# Patient Record
Sex: MALE | Race: BLACK OR AFRICAN AMERICAN | NOT HISPANIC OR LATINO | Employment: OTHER | URBAN - METROPOLITAN AREA
[De-identification: names, ages, dates, MRNs, and addresses within clinical notes are randomized per-mention and may not be internally consistent; named-entity substitution may affect disease eponyms.]

---

## 2023-05-03 ENCOUNTER — OFFICE VISIT (OUTPATIENT)
Dept: PRIMARY CARE | Facility: CLINIC | Age: 73
End: 2023-05-03
Payer: COMMERCIAL

## 2023-05-03 VITALS
HEART RATE: 79 BPM | SYSTOLIC BLOOD PRESSURE: 118 MMHG | BODY MASS INDEX: 33.05 KG/M2 | DIASTOLIC BLOOD PRESSURE: 76 MMHG | TEMPERATURE: 97.7 F | OXYGEN SATURATION: 96 % | WEIGHT: 211 LBS

## 2023-05-03 DIAGNOSIS — E11.9 TYPE 2 DIABETES MELLITUS WITHOUT COMPLICATION, WITHOUT LONG-TERM CURRENT USE OF INSULIN (MULTI): Primary | ICD-10-CM

## 2023-05-03 DIAGNOSIS — G47.33 OBSTRUCTIVE SLEEP APNEA SYNDROME: ICD-10-CM

## 2023-05-03 DIAGNOSIS — E78.5 HYPERLIPIDEMIA, UNSPECIFIED HYPERLIPIDEMIA TYPE: ICD-10-CM

## 2023-05-03 DIAGNOSIS — I10 BENIGN ESSENTIAL HYPERTENSION: ICD-10-CM

## 2023-05-03 DIAGNOSIS — E66.1 CLASS 1 DRUG-INDUCED OBESITY WITH SERIOUS COMORBIDITY AND BODY MASS INDEX (BMI) OF 33.0 TO 33.9 IN ADULT: ICD-10-CM

## 2023-05-03 DIAGNOSIS — Z00.00 MEDICARE ANNUAL WELLNESS VISIT, SUBSEQUENT: ICD-10-CM

## 2023-05-03 PROBLEM — E66.811 CLASS 1 DRUG-INDUCED OBESITY WITH SERIOUS COMORBIDITY AND BODY MASS INDEX (BMI) OF 33.0 TO 33.9 IN ADULT: Status: ACTIVE | Noted: 2023-05-03

## 2023-05-03 PROBLEM — N40.0 BPH (BENIGN PROSTATIC HYPERPLASIA): Status: ACTIVE | Noted: 2023-05-03

## 2023-05-03 LAB — POC HEMOGLOBIN A1C: 8.7 % (ref 4.2–6.5)

## 2023-05-03 PROCEDURE — 3074F SYST BP LT 130 MM HG: CPT | Performed by: STUDENT IN AN ORGANIZED HEALTH CARE EDUCATION/TRAINING PROGRAM

## 2023-05-03 PROCEDURE — 1036F TOBACCO NON-USER: CPT | Performed by: STUDENT IN AN ORGANIZED HEALTH CARE EDUCATION/TRAINING PROGRAM

## 2023-05-03 PROCEDURE — 83036 HEMOGLOBIN GLYCOSYLATED A1C: CPT | Performed by: STUDENT IN AN ORGANIZED HEALTH CARE EDUCATION/TRAINING PROGRAM

## 2023-05-03 PROCEDURE — 4010F ACE/ARB THERAPY RXD/TAKEN: CPT | Performed by: STUDENT IN AN ORGANIZED HEALTH CARE EDUCATION/TRAINING PROGRAM

## 2023-05-03 PROCEDURE — 99397 PER PM REEVAL EST PAT 65+ YR: CPT | Performed by: STUDENT IN AN ORGANIZED HEALTH CARE EDUCATION/TRAINING PROGRAM

## 2023-05-03 PROCEDURE — 1159F MED LIST DOCD IN RCRD: CPT | Performed by: STUDENT IN AN ORGANIZED HEALTH CARE EDUCATION/TRAINING PROGRAM

## 2023-05-03 PROCEDURE — 1170F FXNL STATUS ASSESSED: CPT | Performed by: STUDENT IN AN ORGANIZED HEALTH CARE EDUCATION/TRAINING PROGRAM

## 2023-05-03 PROCEDURE — 1160F RVW MEDS BY RX/DR IN RCRD: CPT | Performed by: STUDENT IN AN ORGANIZED HEALTH CARE EDUCATION/TRAINING PROGRAM

## 2023-05-03 PROCEDURE — 3008F BODY MASS INDEX DOCD: CPT | Performed by: STUDENT IN AN ORGANIZED HEALTH CARE EDUCATION/TRAINING PROGRAM

## 2023-05-03 PROCEDURE — G0439 PPPS, SUBSEQ VISIT: HCPCS | Performed by: STUDENT IN AN ORGANIZED HEALTH CARE EDUCATION/TRAINING PROGRAM

## 2023-05-03 PROCEDURE — 3078F DIAST BP <80 MM HG: CPT | Performed by: STUDENT IN AN ORGANIZED HEALTH CARE EDUCATION/TRAINING PROGRAM

## 2023-05-03 RX ORDER — ROSUVASTATIN CALCIUM 10 MG/1
10 TABLET, COATED ORAL DAILY
COMMUNITY
End: 2023-06-12 | Stop reason: SDUPTHER

## 2023-05-03 RX ORDER — LATANOPROST 50 UG/ML
SOLUTION/ DROPS OPHTHALMIC
COMMUNITY

## 2023-05-03 RX ORDER — DAPAGLIFLOZIN 10 MG/1
10 TABLET, FILM COATED ORAL
COMMUNITY
Start: 2022-08-09 | End: 2023-11-10 | Stop reason: SDUPTHER

## 2023-05-03 RX ORDER — OLMESARTAN MEDOXOMIL 20 MG/1
20 TABLET ORAL DAILY
COMMUNITY
End: 2023-07-25 | Stop reason: SDUPTHER

## 2023-05-03 RX ORDER — ERGOCALCIFEROL 1.25 MG/1
CAPSULE ORAL WEEKLY
COMMUNITY
Start: 2015-04-23

## 2023-05-03 RX ORDER — BLOOD SUGAR DIAGNOSTIC
STRIP MISCELLANEOUS
COMMUNITY
Start: 2021-03-29

## 2023-05-03 RX ORDER — ASPIRIN 81 MG/1
81 TABLET ORAL
COMMUNITY
Start: 2011-04-26

## 2023-05-03 RX ORDER — AMLODIPINE BESYLATE 5 MG/1
5 TABLET ORAL DAILY
COMMUNITY
End: 2023-05-03 | Stop reason: WASHOUT

## 2023-05-03 RX ORDER — AMLODIPINE BESYLATE 5 MG/1
5 TABLET ORAL
COMMUNITY
Start: 2022-08-23 | End: 2023-07-25 | Stop reason: SDUPTHER

## 2023-05-03 RX ORDER — DAPAGLIFLOZIN 10 MG/1
10 TABLET, FILM COATED ORAL DAILY
COMMUNITY
End: 2023-05-03 | Stop reason: WASHOUT

## 2023-05-03 RX ORDER — ROSUVASTATIN CALCIUM 10 MG/1
1 TABLET, COATED ORAL NIGHTLY
COMMUNITY
Start: 2012-08-20 | End: 2023-05-03 | Stop reason: WASHOUT

## 2023-05-03 RX ORDER — POTASSIUM CHLORIDE 750 MG/1
TABLET, FILM COATED, EXTENDED RELEASE ORAL 2 TIMES DAILY
COMMUNITY
Start: 2015-04-23

## 2023-05-03 RX ORDER — PIOGLITAZONE HCL AND METFORMIN HCL 850; 15 MG/1; MG/1
TABLET ORAL
COMMUNITY
Start: 2014-05-28 | End: 2023-12-19 | Stop reason: SDUPTHER

## 2023-05-03 RX ORDER — METFORMIN HYDROCHLORIDE 850 MG/1
850 TABLET ORAL
COMMUNITY
End: 2023-05-03 | Stop reason: WASHOUT

## 2023-05-03 RX ORDER — FAMOTIDINE 20 MG/1
TABLET, FILM COATED ORAL
COMMUNITY
Start: 2020-04-14

## 2023-05-03 RX ORDER — TIMOLOL/BRIMONIDIN/DORZOLAM/PF 0.5-.15-2%
DROPS OPHTHALMIC (EYE)
COMMUNITY
Start: 2018-08-20

## 2023-05-03 RX ORDER — OMEPRAZOLE 20 MG/1
CAPSULE, DELAYED RELEASE ORAL
COMMUNITY
Start: 2020-04-14

## 2023-05-03 RX ORDER — SILDENAFIL 100 MG/1
TABLET, FILM COATED ORAL
COMMUNITY
Start: 2021-03-23

## 2023-05-03 ASSESSMENT — ACTIVITIES OF DAILY LIVING (ADL)
MANAGING_FINANCES: INDEPENDENT
DOING_HOUSEWORK: INDEPENDENT
BATHING: INDEPENDENT
DRESSING: INDEPENDENT
TAKING_MEDICATION: INDEPENDENT
GROCERY_SHOPPING: INDEPENDENT

## 2023-05-03 ASSESSMENT — PATIENT HEALTH QUESTIONNAIRE - PHQ9
SUM OF ALL RESPONSES TO PHQ9 QUESTIONS 1 AND 2: 0
2. FEELING DOWN, DEPRESSED OR HOPELESS: NOT AT ALL
1. LITTLE INTEREST OR PLEASURE IN DOING THINGS: NOT AT ALL

## 2023-05-03 NOTE — PATIENT INSTRUCTIONS
Please stop at the lab ([address]) to complete your blood and/or urine work that I've ordered for you.    I will contact you with the results at my soonest convenience. I strongly urge you to use [facility] as this is the quickest and easiest way to access your results and recieve my correspondences.     I was unable to fill out the forms for you today, as you do not qualify as having a disability    I recommend that you lose weight via lifestyle modifications such as diet and exercise.     Follow up with your specialists as previously scheduled.    See me every 6 months.

## 2023-05-03 NOTE — PROGRESS NOTES
Subjective   Reason for Visit: Santo Mcdaniel Jr. is an 73 y.o. male here for a Medicare Wellness visit and Annual Exam.    Past Medical, Surgical, and Family History reviewed and updated in chart.    Reviewed all medications by prescribing practitioner or clinical pharmacist (such as prescriptions, OTCs, herbal therapies and supplements) and documented in the medical record.    Patient normally sees Dr. Whitehead in the practice, in my schedule today as he retired and patient is transitioning care.     HPI  Re: DM2 - A1c not at goal, 8.7%. Has close follow up with endorcinology, pt opts to get his meds changed by this doctor. He tells me he hasn't been as adherent to his meds as he used to be, as his girlfriend of many years recently passed away. Patient denies symptomatic highs or lows with regards to their glucose. Reports no polyuria, polydipsia, fatigue, vision changes, and stocking/glove neuropathy. Reports fair foot hygiene, denies knowledge of any foot ulcers or trauma.     Re: CYNDIE - good compliance with CPAP.    Re: obesity - BMI ~33. Contemplative about meaningful lifestyle changes.    Re: HTN - at goal today. Reports no sx high or low from HTN; denies blurry vision, HA, dizziness LoC CP SoB Riojas and leg swelling     Re: support dog - asking for paperwork to be filled out for support animal; he expressed desire to be the caretaker for his  girlfriend's dog, however patient without a disability and also no rapport with a  to justify having this pet as a support animal.     Re: HM - PSA no longer indicated. Colon screen UTD. Shots UTD.     PMHx, FHx, Social Hx, Surg Hx personally reviewed at this appointment. No pertinent findings and/or changes from prior (if applicable).    ROS: Denies wt gain/loss f/c HA LoC CP SOB NVDC. See HPI above, and scanned sheet (if applicable). All other systems are reviewed and are without complaint.       Patient Care Team:  Eliseo Swanson MD as PCP - General      Review of Systems    Objective   Vitals:  /76   Pulse 79   Temp 36.5 °C (97.7 °F)   Wt 95.7 kg (211 lb)   SpO2 96%   BMI 33.05 kg/m²       Physical Exam  Gen: obese, NAD. AAO x3.  HEENT: NC/AT. Anicteric sclera, symmetric pupils. MMM no thrush.  Neck: Soft, supple. No LAD. No goiter.  CV: RRR nl s1s2 no m/r/g  Pulm: CTAB no w/r/r, good air exchange  GI: soft NTND BS+ no hsm  Ext: WWP no edema  Neuro: II-XII grossly intact, nonfocal systemic findings  MSK: 5/5 strength b/l UE and LE  Gait: unremarkable   Feet: no ulcers, nl monofilament testing       Assessment/Plan   # DM2: Poorly controlled, not at goal  - Patient opts to have meds changed at endocrine apt in coming weeks  - medication compliance talked about at great length today   - Diabetic labs  - counselled on wt loss, diet, exercise, and lifestyle modifications  - yearly foot exams, eye exams     # HTN: at/near goal  - continue current regimen  - routine lab work if not recent  - continue lifestyle modifications    # CYNDIE on CPAP  - continue using CPAP     # Obesity: BMI > 30  - counselled on wt loss via diet, exercise, and other lifestyle modifications     # question about emotional Support animal  - pt does not qualify per the forms he brought in    # Health Maintenance  - routine blood work  - Colon Cancer Screening: UTD  - PSA: no longer indicated due to age   - Immunizations:  UTD  - AAA screening: not indicated         Problem List Items Addressed This Visit       Benign essential hypertension    Diabetes mellitus (CMS/HCC) - Primary    Relevant Orders    CBC and Auto Differential    Comprehensive Metabolic Panel    Lipid Panel    Albumin , Urine Random    Urinalysis Microscopic Only    POCT glycosylated hemoglobin (Hb A1C) manually resulted (Completed)    Follow Up In [facility] - PCP    Obstructive sleep apnea syndrome    Hyperlipidemia    Class 1 drug-induced obesity with serious comorbidity and body mass index (BMI) of 33.0  to 33.9 in adult

## 2023-06-12 ENCOUNTER — OFFICE VISIT (OUTPATIENT)
Dept: PRIMARY CARE | Facility: CLINIC | Age: 73
End: 2023-06-12
Payer: COMMERCIAL

## 2023-06-12 VITALS
OXYGEN SATURATION: 98 % | HEIGHT: 67 IN | HEART RATE: 73 BPM | WEIGHT: 215 LBS | BODY MASS INDEX: 33.74 KG/M2 | DIASTOLIC BLOOD PRESSURE: 81 MMHG | TEMPERATURE: 97.6 F | SYSTOLIC BLOOD PRESSURE: 139 MMHG

## 2023-06-12 DIAGNOSIS — E11.9 TYPE 2 DIABETES MELLITUS WITHOUT COMPLICATION, WITHOUT LONG-TERM CURRENT USE OF INSULIN (MULTI): ICD-10-CM

## 2023-06-12 DIAGNOSIS — E78.5 HYPERLIPIDEMIA, UNSPECIFIED HYPERLIPIDEMIA TYPE: Primary | ICD-10-CM

## 2023-06-12 PROBLEM — K21.9 ESOPHAGEAL REFLUX: Status: ACTIVE | Noted: 2023-06-12

## 2023-06-12 PROCEDURE — 1159F MED LIST DOCD IN RCRD: CPT | Performed by: STUDENT IN AN ORGANIZED HEALTH CARE EDUCATION/TRAINING PROGRAM

## 2023-06-12 PROCEDURE — 99213 OFFICE O/P EST LOW 20 MIN: CPT | Performed by: STUDENT IN AN ORGANIZED HEALTH CARE EDUCATION/TRAINING PROGRAM

## 2023-06-12 PROCEDURE — 4010F ACE/ARB THERAPY RXD/TAKEN: CPT | Performed by: STUDENT IN AN ORGANIZED HEALTH CARE EDUCATION/TRAINING PROGRAM

## 2023-06-12 PROCEDURE — 3075F SYST BP GE 130 - 139MM HG: CPT | Performed by: STUDENT IN AN ORGANIZED HEALTH CARE EDUCATION/TRAINING PROGRAM

## 2023-06-12 PROCEDURE — 1036F TOBACCO NON-USER: CPT | Performed by: STUDENT IN AN ORGANIZED HEALTH CARE EDUCATION/TRAINING PROGRAM

## 2023-06-12 PROCEDURE — 3008F BODY MASS INDEX DOCD: CPT | Performed by: STUDENT IN AN ORGANIZED HEALTH CARE EDUCATION/TRAINING PROGRAM

## 2023-06-12 PROCEDURE — 1160F RVW MEDS BY RX/DR IN RCRD: CPT | Performed by: STUDENT IN AN ORGANIZED HEALTH CARE EDUCATION/TRAINING PROGRAM

## 2023-06-12 PROCEDURE — 3079F DIAST BP 80-89 MM HG: CPT | Performed by: STUDENT IN AN ORGANIZED HEALTH CARE EDUCATION/TRAINING PROGRAM

## 2023-06-12 RX ORDER — ROSUVASTATIN CALCIUM 10 MG/1
10 TABLET, COATED ORAL DAILY
Qty: 90 TABLET | Refills: 3 | Status: SHIPPED | OUTPATIENT
Start: 2023-06-12 | End: 2024-06-11

## 2023-06-12 NOTE — PROGRESS NOTES
"Subjective   Patient ID: Santo Mcdaniel Jr. is a 73 y.o. male who presents for Follow-up.    HPI   Re: DM2 - A1c last checked a few weeks ago, was not at goal, 8.7%. Has close follow up with endorcinology, pt opts to get his meds changed by this doctor. He tells me he hasn't been as adherent to his meds as he used to be, as his girlfriend of many years recently passed away. Patient denies symptomatic highs or lows with regards to their glucose. Reports no polyuria, polydipsia, fatigue, vision changes, and stocking/glove neuropathy. Reports fair foot hygiene, denies knowledge of any foot ulcers or trauma.      Re: CYNDIE - good compliance with CPAP.     Re: obesity - BMI ~33. Contemplative about meaningful lifestyle changes.     Re: HTN - at/near goal today. Reports no sx high or low from HTN; denies blurry vision, HA, dizziness LoC CP SoB Riojas and leg swelling       Re: HM - PSA no longer indicated. Colon screen UTD. Shots UTD.      PMHx, FHx, Social Hx, Surg Hx personally reviewed at this appointment. No pertinent findings and/or changes from prior (if applicable).     ROS: Denies wt gain/loss f/c HA LoC CP SOB NVDC. See HPI above, and scanned sheet (if applicable). All other systems are reviewed and are without complaint.   Review of Systems    Objective   /81   Pulse 73   Temp 36.4 °C (97.6 °F)   Ht 1.702 m (5' 7\")   Wt 97.5 kg (215 lb)   SpO2 98%   BMI 33.67 kg/m²     Physical Exam  Gen: obese, NAD. AAO x3.  HEENT: NC/AT. Anicteric sclera, symmetric pupils. MMM no thrush.  Neck: Soft, supple. No LAD. No goiter.  CV: RRR nl s1s2 no m/r/g  Pulm: CTAB no w/r/r, good air exchange  GI: soft NTND BS+ no hsm  Ext: WWP no edema  Neuro: II-XII grossly intact, nonfocal systemic findings  MSK: 5/5 strength b/l UE and LE  Gait: unremarkable   Feet: no ulcers, nl monofilament testing     Assessment/Plan   # DM2: Not at goal  - will touch base with Dr. Abdalla  - medication compliance talked about at great length today "   - Diabetic labs  - counselled on wt loss, diet, exercise, and lifestyle modifications  - yearly foot exams, eye exams      # HTN: at/near goal  - continue current regimen  - routine lab work if not recent  - continue lifestyle modifications     # CYNDIE on CPAP  - continue using CPAP      # Obesity: BMI > 30  - counselled on wt loss via diet, exercise, and other lifestyle modifications      # question about emotional Support animal  - pt does not qualify per the forms he brought in     # Health Maintenance  - routine blood work  - Colon Cancer Screening: UTD  - PSA: no longer indicated due to age   - Immunizations:  UTD  - AAA screening: not indicated     Problem List Items Addressed This Visit       Diabetes mellitus (CMS/HCC)    Relevant Medications    rosuvastatin (Crestor) 10 mg tablet    Hyperlipidemia - Primary    Relevant Medications    rosuvastatin (Crestor) 10 mg tablet

## 2023-06-12 NOTE — PATIENT INSTRUCTIONS
Please stop at the lab ([address]) to complete your blood and/or urine work that I've ordered for you.    I will contact you with the results at my soonest convenience. I strongly urge you to use [facility] as this is the quickest and easiest way to access your results and recieve my correspondences.    I have renewed your chronic medications today    I will reach out to Dr. Abdalla about your sugars.

## 2023-07-25 DIAGNOSIS — I10 BENIGN ESSENTIAL HYPERTENSION: ICD-10-CM

## 2023-07-25 RX ORDER — AMLODIPINE BESYLATE 5 MG/1
5 TABLET ORAL
Qty: 90 TABLET | Refills: 2 | Status: SHIPPED | OUTPATIENT
Start: 2023-07-25 | End: 2023-11-08 | Stop reason: SDUPTHER

## 2023-07-25 RX ORDER — OLMESARTAN MEDOXOMIL 20 MG/1
20 TABLET ORAL DAILY
Qty: 90 TABLET | Refills: 2 | Status: SHIPPED | OUTPATIENT
Start: 2023-07-25 | End: 2023-11-08 | Stop reason: SDUPTHER

## 2023-11-06 ENCOUNTER — OFFICE VISIT (OUTPATIENT)
Dept: PRIMARY CARE | Facility: CLINIC | Age: 73
End: 2023-11-06
Payer: MEDICARE

## 2023-11-06 VITALS
WEIGHT: 211.2 LBS | BODY MASS INDEX: 33.08 KG/M2 | OXYGEN SATURATION: 97 % | SYSTOLIC BLOOD PRESSURE: 143 MMHG | DIASTOLIC BLOOD PRESSURE: 82 MMHG | HEART RATE: 64 BPM

## 2023-11-06 DIAGNOSIS — I10 BENIGN ESSENTIAL HYPERTENSION: Primary | ICD-10-CM

## 2023-11-06 DIAGNOSIS — E11.9 TYPE 2 DIABETES MELLITUS WITHOUT COMPLICATION, WITHOUT LONG-TERM CURRENT USE OF INSULIN (MULTI): ICD-10-CM

## 2023-11-06 DIAGNOSIS — E11.3511 TYPE 2 DIABETES MELLITUS WITH RIGHT EYE AFFECTED BY PROLIFERATIVE RETINOPATHY AND MACULAR EDEMA, WITHOUT LONG-TERM CURRENT USE OF INSULIN (MULTI): ICD-10-CM

## 2023-11-06 LAB — POC HEMOGLOBIN A1C: 8.3 % (ref 4.2–6.5)

## 2023-11-06 PROCEDURE — 83036 HEMOGLOBIN GLYCOSYLATED A1C: CPT | Performed by: STUDENT IN AN ORGANIZED HEALTH CARE EDUCATION/TRAINING PROGRAM

## 2023-11-06 PROCEDURE — 3077F SYST BP >= 140 MM HG: CPT | Performed by: STUDENT IN AN ORGANIZED HEALTH CARE EDUCATION/TRAINING PROGRAM

## 2023-11-06 PROCEDURE — 1160F RVW MEDS BY RX/DR IN RCRD: CPT | Performed by: STUDENT IN AN ORGANIZED HEALTH CARE EDUCATION/TRAINING PROGRAM

## 2023-11-06 PROCEDURE — 99213 OFFICE O/P EST LOW 20 MIN: CPT | Performed by: STUDENT IN AN ORGANIZED HEALTH CARE EDUCATION/TRAINING PROGRAM

## 2023-11-06 PROCEDURE — 4010F ACE/ARB THERAPY RXD/TAKEN: CPT | Performed by: STUDENT IN AN ORGANIZED HEALTH CARE EDUCATION/TRAINING PROGRAM

## 2023-11-06 PROCEDURE — 1036F TOBACCO NON-USER: CPT | Performed by: STUDENT IN AN ORGANIZED HEALTH CARE EDUCATION/TRAINING PROGRAM

## 2023-11-06 PROCEDURE — 90662 IIV NO PRSV INCREASED AG IM: CPT | Performed by: STUDENT IN AN ORGANIZED HEALTH CARE EDUCATION/TRAINING PROGRAM

## 2023-11-06 PROCEDURE — G0008 ADMIN INFLUENZA VIRUS VAC: HCPCS | Performed by: STUDENT IN AN ORGANIZED HEALTH CARE EDUCATION/TRAINING PROGRAM

## 2023-11-06 PROCEDURE — 1159F MED LIST DOCD IN RCRD: CPT | Performed by: STUDENT IN AN ORGANIZED HEALTH CARE EDUCATION/TRAINING PROGRAM

## 2023-11-06 PROCEDURE — 3079F DIAST BP 80-89 MM HG: CPT | Performed by: STUDENT IN AN ORGANIZED HEALTH CARE EDUCATION/TRAINING PROGRAM

## 2023-11-06 PROCEDURE — 3008F BODY MASS INDEX DOCD: CPT | Performed by: STUDENT IN AN ORGANIZED HEALTH CARE EDUCATION/TRAINING PROGRAM

## 2023-11-06 NOTE — PROGRESS NOTES
Subjective   Patient ID: Santo Mcdaniel Jr. is a 73 y.o. male who presents for No chief complaint on file..    HPI   Re: DM2 - A1c today of 8.3%.   Follows with endocrinology (Rm) , pt opts to get his meds changed by this doctor. He tells me he hasn't been as adherent to his meds as he used to be, as his girlfriend of many years recently passed away. Patient denies symptomatic highs or lows with regards to their glucose. Reports no polyuria, polydipsia, fatigue, vision changes, and stocking/glove neuropathy. Reports fair foot hygiene, denies knowledge of any foot ulcers or trauma.      Re: CYNDIE - good compliance with CPAP.     Re: obesity - BMI ~33. Contemplative about meaningful lifestyle changes.     Re: HTN - at/near goal today. Reports no sx high or low from HTN; denies blurry vision, HA, dizziness LoC CP SoB Riojas and leg swelling       Re: HM - PSA no longer indicated. Colon screen UTD. Shots UTD.      PMHx, FHx, Social Hx, Surg Hx personally reviewed at this appointment. No pertinent findings and/or changes from prior (if applicable).     ROS: Denies wt gain/loss f/c HA LoC CP SOB NVDC. See HPI above, and scanned sheet (if applicable). All other systems are reviewed and are without complaint.   Review of Systems    Review of Systems    Objective   /82   Pulse 64   Wt 95.8 kg (211 lb 3.2 oz)   SpO2 97%   BMI 33.08 kg/m²     Physical Exam  Gen: NAD. AAO x3.  HEENT: NC/AT. Anicteric sclera, symmetric pupils. MMM no thrush.  Neck: Soft, supple. No LAD. No goiter.  CV: RRR nl s1s2 no m/r/g  Pulm: CTAB no w/r/r, good air exchange  GI: soft NTND BS+ no hsm  Ext: WWP no edema  Neuro: II-XII grossly intact, nonfocal systemic findings  MSK: 5/5 strength b/l UE and LE  Gait: unremarkable   Feet: no ulcers, nl monofilament testing     Lab Results   Component Value Date    WBC 5.6 06/08/2022    HGB 13.7 06/08/2022    HCT 42.6 06/08/2022     06/08/2022    CHOL 133 06/08/2022    TRIG 94 06/08/2022    HDL  45.3 06/08/2022    ALT 10 06/08/2022    AST 13 06/08/2022     06/08/2022    K 4.2 06/08/2022     06/08/2022    CREATININE 1.13 06/08/2022    BUN 15 06/08/2022    CO2 28 06/08/2022    TSH 1.44 06/08/2022    HGBA1C 8.3 (A) 11/06/2023     par       Assessment/Plan   # DM2: improving but still not at goal; 8.3% today (from 8.7%)  - will touch base with Dr. Abdalla  - medication compliance talked about at great length today   - Diabetic labs  - counselled on wt loss, diet, exercise, and lifestyle modifications  - yearly foot exams, eye exams      # HTN: at/near goal  - continue current regimen  - routine lab work if not recent  - continue lifestyle modifications     # CYNDIE on CPAP  - continue using CPAP      # Obesity: BMI > 30  - counselled on wt loss via diet, exercise, and other lifestyle modifications      # Health Maintenance  - routine blood work  - Colon Cancer Screening: UTD  - PSA: no longer indicated due to age   - Immunizations:  UTD  - AAA screening: not indicated       Problem List Items Addressed This Visit             ICD-10-CM    Benign essential hypertension - Primary I10    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

## 2023-11-06 NOTE — PATIENT INSTRUCTIONS
Your blood work is up to date; no need for additional draw at this appointment.    Your medications are up to date today, I will renew them when a refill is due.     Follow up with your specialists as previously scheduled.     You received your flu shot today!

## 2023-11-08 DIAGNOSIS — I10 BENIGN ESSENTIAL HYPERTENSION: ICD-10-CM

## 2023-11-08 RX ORDER — OLMESARTAN MEDOXOMIL 20 MG/1
20 TABLET ORAL DAILY
Qty: 90 TABLET | Refills: 2 | Status: SHIPPED | OUTPATIENT
Start: 2023-11-08 | End: 2023-11-10 | Stop reason: SDUPTHER

## 2023-11-08 RX ORDER — AMLODIPINE BESYLATE 5 MG/1
5 TABLET ORAL
Qty: 90 TABLET | Refills: 2 | Status: SHIPPED | OUTPATIENT
Start: 2023-11-08 | End: 2023-11-10 | Stop reason: SDUPTHER

## 2023-11-08 NOTE — TELEPHONE ENCOUNTER
Patient wants his Olmesartan and Amlodipine sent to [facility] pharmacy but would like his Farxiga sent to [facility] on file.

## 2023-11-10 DIAGNOSIS — I10 BENIGN ESSENTIAL HYPERTENSION: ICD-10-CM

## 2023-11-11 RX ORDER — AMLODIPINE BESYLATE 5 MG/1
5 TABLET ORAL
Qty: 90 TABLET | Refills: 2 | Status: SHIPPED | OUTPATIENT
Start: 2023-11-11 | End: 2023-11-14 | Stop reason: SDUPTHER

## 2023-11-11 RX ORDER — DAPAGLIFLOZIN 10 MG/1
10 TABLET, FILM COATED ORAL
Qty: 90 TABLET | Refills: 3 | Status: SHIPPED | OUTPATIENT
Start: 2023-11-11 | End: 2023-11-13 | Stop reason: SDUPTHER

## 2023-11-11 RX ORDER — OLMESARTAN MEDOXOMIL 20 MG/1
20 TABLET ORAL DAILY
Qty: 90 TABLET | Refills: 2 | Status: SHIPPED | OUTPATIENT
Start: 2023-11-11 | End: 2023-11-14 | Stop reason: SDUPTHER

## 2023-11-13 DIAGNOSIS — I10 BENIGN ESSENTIAL HYPERTENSION: ICD-10-CM

## 2023-11-13 RX ORDER — DAPAGLIFLOZIN 10 MG/1
10 TABLET, FILM COATED ORAL
Qty: 90 TABLET | Refills: 3 | Status: SHIPPED | OUTPATIENT
Start: 2023-11-13 | End: 2023-11-13 | Stop reason: SDUPTHER

## 2023-11-13 RX ORDER — DAPAGLIFLOZIN 10 MG/1
10 TABLET, FILM COATED ORAL
Qty: 90 TABLET | Refills: 3 | Status: SHIPPED | OUTPATIENT
Start: 2023-11-13

## 2023-11-14 DIAGNOSIS — I10 BENIGN ESSENTIAL HYPERTENSION: ICD-10-CM

## 2023-11-14 RX ORDER — AMLODIPINE BESYLATE 5 MG/1
5 TABLET ORAL
Qty: 90 TABLET | Refills: 2 | Status: SHIPPED | OUTPATIENT
Start: 2023-11-14

## 2023-11-14 RX ORDER — OLMESARTAN MEDOXOMIL 20 MG/1
20 TABLET ORAL DAILY
Qty: 90 TABLET | Refills: 2 | Status: SHIPPED | OUTPATIENT
Start: 2023-11-14

## 2023-12-01 ENCOUNTER — OFFICE VISIT (OUTPATIENT)
Dept: ENDOCRINOLOGY | Facility: CLINIC | Age: 73
End: 2023-12-01
Payer: COMMERCIAL

## 2023-12-01 VITALS — WEIGHT: 205 LBS | DIASTOLIC BLOOD PRESSURE: 64 MMHG | SYSTOLIC BLOOD PRESSURE: 118 MMHG | BODY MASS INDEX: 32.11 KG/M2

## 2023-12-01 DIAGNOSIS — E78.5 HYPERLIPIDEMIA, UNSPECIFIED HYPERLIPIDEMIA TYPE: ICD-10-CM

## 2023-12-01 DIAGNOSIS — E11.3511 TYPE 2 DIABETES MELLITUS WITH RIGHT EYE AFFECTED BY PROLIFERATIVE RETINOPATHY AND MACULAR EDEMA, WITHOUT LONG-TERM CURRENT USE OF INSULIN (MULTI): Primary | ICD-10-CM

## 2023-12-01 DIAGNOSIS — I10 BENIGN ESSENTIAL HYPERTENSION: ICD-10-CM

## 2023-12-01 PROCEDURE — 3078F DIAST BP <80 MM HG: CPT | Performed by: INTERNAL MEDICINE

## 2023-12-01 PROCEDURE — 99214 OFFICE O/P EST MOD 30 MIN: CPT | Performed by: INTERNAL MEDICINE

## 2023-12-01 PROCEDURE — 1036F TOBACCO NON-USER: CPT | Performed by: INTERNAL MEDICINE

## 2023-12-01 PROCEDURE — 1159F MED LIST DOCD IN RCRD: CPT | Performed by: INTERNAL MEDICINE

## 2023-12-01 PROCEDURE — 1160F RVW MEDS BY RX/DR IN RCRD: CPT | Performed by: INTERNAL MEDICINE

## 2023-12-01 PROCEDURE — 4010F ACE/ARB THERAPY RXD/TAKEN: CPT | Performed by: INTERNAL MEDICINE

## 2023-12-01 PROCEDURE — 3074F SYST BP LT 130 MM HG: CPT | Performed by: INTERNAL MEDICINE

## 2023-12-01 PROCEDURE — 3008F BODY MASS INDEX DOCD: CPT | Performed by: INTERNAL MEDICINE

## 2023-12-01 NOTE — PROGRESS NOTES
Patient ID: Santo Mcdaniel Jr. is a 73 y.o. male who presents for Follow-up.  HPI  The patient comes in for follow up.    He has type 2 diabetes hypertension hyperlipidemia and sleep apnea.    He remains on a more balanced diet but has not been consistent.    He has not been exercising.    When he has checked blood sugars they have been low 100s.    He remains off of glimepiride and has had no low blood sugars.    He continues on pioglitazone/metformin 15/1000 mg twice daily Farxiga 10 mg.    He continues to be under stress since his girlfriend  last December.    Physically he has no other complaints.    ROS  Comprehensive review of systems is negative.    Objective   Physical Exam  There were no vitals taken for this visit.   Vitals:    23 1114   Weight: 93 kg (205 lb)      Body mass index is 32.11 kg/m².      Weight 205 down 8 pounds for total of 38    ENT normal. No adenopathy  Fundi normal  Thyroid palpable and normal. No nodules  Chest clear to auscultation  Heart sounds are normal  Abdomen nontender. Bowel sounds normal. No organomegaly  Feet are okay  Normal vibration and monofilament sensation normal pulses, no lesions    Assessment/Plan     1.  Type 2 diabetes  2.  Hypertension  3.  Hyperlipidemia  4.  Sleep apnea    We reviewed his blood work from last month.    While his hemoglobin A1c is coming down it is still higher than ideal.    He will work on diet and exercise.    We discussed adding a DPP 4 inhibitor as our next step if he is not able to get the sugars under control.    Encouraged him to keep up with fluids.    He will follow-up with me in 6 months sooner as needed.

## 2023-12-11 ENCOUNTER — TELEPHONE (OUTPATIENT)
Dept: ENDOCRINOLOGY | Facility: CLINIC | Age: 73
End: 2023-12-11
Payer: MEDICARE

## 2023-12-11 DIAGNOSIS — N52.9 ERECTILE DYSFUNCTION, UNSPECIFIED ERECTILE DYSFUNCTION TYPE: Primary | ICD-10-CM

## 2023-12-11 NOTE — PROGRESS NOTES
Patient ID: Santo Mcdaniel Jr. is a 73 y.o. male who presents for No chief complaint on file..  HPI  ***    ROS  Comprehensive review of systems is negative.    Objective   Physical Exam  There were no vitals taken for this visit.   There were no vitals filed for this visit.   There is no height or weight on file to calculate BMI.      ***    Assessment/Plan   ***

## 2023-12-19 DIAGNOSIS — I10 BENIGN ESSENTIAL HYPERTENSION: ICD-10-CM

## 2023-12-19 DIAGNOSIS — E78.5 HYPERLIPIDEMIA, UNSPECIFIED HYPERLIPIDEMIA TYPE: ICD-10-CM

## 2023-12-19 DIAGNOSIS — E11.3511 TYPE 2 DIABETES MELLITUS WITH RIGHT EYE AFFECTED BY PROLIFERATIVE RETINOPATHY AND MACULAR EDEMA, WITHOUT LONG-TERM CURRENT USE OF INSULIN (MULTI): ICD-10-CM

## 2023-12-19 RX ORDER — PIOGLITAZONE HCL AND METFORMIN HCL 850; 15 MG/1; MG/1
TABLET ORAL
Qty: 90 TABLET | Refills: 2 | Status: SHIPPED | OUTPATIENT
Start: 2023-12-19 | End: 2024-06-07 | Stop reason: SDUPTHER

## 2024-01-17 ENCOUNTER — OFFICE VISIT (OUTPATIENT)
Dept: UROLOGY | Facility: HOSPITAL | Age: 74
End: 2024-01-17
Payer: MEDICARE

## 2024-01-17 DIAGNOSIS — N40.0 BENIGN PROSTATIC HYPERPLASIA, UNSPECIFIED WHETHER LOWER URINARY TRACT SYMPTOMS PRESENT: ICD-10-CM

## 2024-01-17 DIAGNOSIS — N52.9 ERECTILE DYSFUNCTION, UNSPECIFIED ERECTILE DYSFUNCTION TYPE: Primary | ICD-10-CM

## 2024-01-17 LAB
POC APPEARANCE, URINE: CLEAR
POC BILIRUBIN, URINE: NEGATIVE
POC BLOOD, URINE: ABNORMAL
POC COLOR, URINE: YELLOW
POC GLUCOSE, URINE: NEGATIVE MG/DL
POC KETONES, URINE: NEGATIVE MG/DL
POC LEUKOCYTES, URINE: NEGATIVE
POC NITRITE,URINE: NEGATIVE
POC PH, URINE: 7 PH
POC PROTEIN, URINE: ABNORMAL MG/DL
POC SPECIFIC GRAVITY, URINE: 1.02
POC UROBILINOGEN, URINE: 0.2 EU/DL

## 2024-01-17 PROCEDURE — 4010F ACE/ARB THERAPY RXD/TAKEN: CPT | Performed by: STUDENT IN AN ORGANIZED HEALTH CARE EDUCATION/TRAINING PROGRAM

## 2024-01-17 PROCEDURE — 99204 OFFICE O/P NEW MOD 45 MIN: CPT | Performed by: STUDENT IN AN ORGANIZED HEALTH CARE EDUCATION/TRAINING PROGRAM

## 2024-01-17 PROCEDURE — 1160F RVW MEDS BY RX/DR IN RCRD: CPT | Performed by: STUDENT IN AN ORGANIZED HEALTH CARE EDUCATION/TRAINING PROGRAM

## 2024-01-17 PROCEDURE — 1159F MED LIST DOCD IN RCRD: CPT | Performed by: STUDENT IN AN ORGANIZED HEALTH CARE EDUCATION/TRAINING PROGRAM

## 2024-01-17 PROCEDURE — 99214 OFFICE O/P EST MOD 30 MIN: CPT | Performed by: STUDENT IN AN ORGANIZED HEALTH CARE EDUCATION/TRAINING PROGRAM

## 2024-01-17 PROCEDURE — 3008F BODY MASS INDEX DOCD: CPT | Performed by: STUDENT IN AN ORGANIZED HEALTH CARE EDUCATION/TRAINING PROGRAM

## 2024-01-17 PROCEDURE — 1036F TOBACCO NON-USER: CPT | Performed by: STUDENT IN AN ORGANIZED HEALTH CARE EDUCATION/TRAINING PROGRAM

## 2024-01-17 PROCEDURE — 81003 URINALYSIS AUTO W/O SCOPE: CPT | Performed by: STUDENT IN AN ORGANIZED HEALTH CARE EDUCATION/TRAINING PROGRAM

## 2024-01-17 RX ORDER — TADALAFIL 5 MG/1
5 TABLET ORAL DAILY
Qty: 30 TABLET | Refills: 2 | Status: SHIPPED | OUTPATIENT
Start: 2024-01-17 | End: 2024-04-16

## 2024-01-17 RX ORDER — TADALAFIL 10 MG/1
10 TABLET ORAL DAILY PRN
Qty: 12 TABLET | Refills: 0 | Status: SHIPPED | OUTPATIENT
Start: 2024-01-17 | End: 2024-02-16

## 2024-01-17 NOTE — PROGRESS NOTES
Subjective   Patient ID: Santo Mcdaniel Jr. is a 73 y.o. male who presents for No chief complaint on file.      HPI  73 y.o. male who presents for ED. Pt has good sex drive and libido. Can initiate an erection, has difficulty maintaining it. Has a current partner.     The patient has been diagnosed with ED and cardiac assessment according to the [place] criteria allows use of oral PDE-5 inhibitor. The patient understands that these medications are not initiators of erection and that he will still require sexual stimulation. If prescribed vardenafil or sildenafil, he was advised to take the medication 30-60 minutes prior to sexual activity and that the efficacy of the medication is decrease following a high-fat meal.     The patient verbalized understanding that nitrates such as nitroglycerin, isosorbide mononirate, isosorbide dinitrate and any other nitrate preparations are absolutely contradicted with the use of a PDE-5 inhibitor. The patient was advised to alert any medical person of PDE-5 inhibitor use should he seek urgent medical attention for any reason.     I explained the common adverse effects of therapy including but not limited to headache, flushing, dizziness, rash, upset stomach, diarrhea, nasal congestion, abnormal vision, back pain and myalgia. Less common side effects are lightheadedness or blood pressure drop upon standing. We discussed that patients have  after using medications in this class, and sometimes the exact cause of death was not able to be determined. There is a very small risk of nonartertic ischemic optic neuropathy, a rare cause of blindness that may be permanent while using medications in this class. Patient is instructed to immediately stop this medication and seek medical attention if he develops visual disturbance in one or both eyes.     We discussed that if he experiences erection lasting longer than four hours, he needs to seek immediate treatment at the ER as the longer he  waits, the more damage is done to the penile tissue. The patient states that he is not withholding any information about his condition or the use of medications in order to receive PDE-5 inhibitor class medication. No barriers to learning were identified. After all of the patients questions were satisfactorily answered, he expressed understanding of the risks of therapy and wishes to proceed.      Review of Systems    All systems were reviewed. Anything negative was noted in the HPI.    Objective   Physical Exam    General: Well developed, well nourished, alert and cooperative, appears in no acute distress   Eyes: Non-injected conjunctiva, sclera clear, no proptosis   Cardiac: Extremities are warm and well perfused. No edema, cyanosis or pallor   Lungs: Breathing is easy, non-labored. Speaking in clear and complete sentences. Normal diaphragmatic movement   MSK: Ambulatory with steady gait, unassisted   Neuro: Alert and oriented to person, place, and time   Psych: Demonstrates good judgment and reason, without hallucinations, abnormal affect or abnormal behaviors   Skin: No obvious lesions, no rashes       No CVA tenderness bilaterally   No suprapubic pain or discomfort       Past Medical History:   Diagnosis Date    Epigastric pain 02/18/2021    Dyspepsia    Other conditions influencing health status 08/17/2016    Complete Colonoscopy    Personal history of diseases of the blood and blood-forming organs and certain disorders involving the immune mechanism 08/23/2019    History of anemia    Personal history of other benign neoplasm 04/14/2020    History of other benign neoplasm         Past Surgical History:   Procedure Laterality Date    APPENDECTOMY  08/15/2014    Appendectomy         Assessment/Plan     ED    73 y.o. male who presents for ED, We had a very long and extensive discussion with the patient regarding the pathophysiology, differential diagnosis, risk factor, and management of ED. We discussed at length  mechanism of action, risk, benefit, potential complication, adverse events of PDE 5 inhibitors in the form of Sildenafil 5mg daily and 10mg as needed. Instructed the patient to stop the medication in case of any side effects.    Plan:  - PSA for prostatic cancer screening  - Follow up in 6 weeks  - Sildenafil 5mg daily and 10mg as needed                  Scribe Attestation  By signing my name below, IFrancisca Scribe   attest that this documentation has been prepared under the direction and in the presence of Dr. Suraj Rene

## 2024-03-04 ENCOUNTER — OFFICE VISIT (OUTPATIENT)
Dept: PRIMARY CARE | Facility: CLINIC | Age: 74
End: 2024-03-04
Payer: MEDICARE

## 2024-03-04 VITALS
HEIGHT: 67 IN | DIASTOLIC BLOOD PRESSURE: 71 MMHG | WEIGHT: 200 LBS | SYSTOLIC BLOOD PRESSURE: 108 MMHG | TEMPERATURE: 97.6 F | BODY MASS INDEX: 31.39 KG/M2 | HEART RATE: 67 BPM | OXYGEN SATURATION: 98 %

## 2024-03-04 DIAGNOSIS — Z00.00 HEALTHCARE MAINTENANCE: Primary | ICD-10-CM

## 2024-03-04 DIAGNOSIS — Z00.00 MEDICARE ANNUAL WELLNESS VISIT, SUBSEQUENT: ICD-10-CM

## 2024-03-04 DIAGNOSIS — E78.5 HYPERLIPIDEMIA, UNSPECIFIED HYPERLIPIDEMIA TYPE: ICD-10-CM

## 2024-03-04 DIAGNOSIS — E11.3511 TYPE 2 DIABETES MELLITUS WITH RIGHT EYE AFFECTED BY PROLIFERATIVE RETINOPATHY AND MACULAR EDEMA, WITHOUT LONG-TERM CURRENT USE OF INSULIN (MULTI): ICD-10-CM

## 2024-03-04 DIAGNOSIS — R19.5 LOOSE STOOLS: ICD-10-CM

## 2024-03-04 DIAGNOSIS — G47.33 OBSTRUCTIVE SLEEP APNEA SYNDROME: ICD-10-CM

## 2024-03-04 DIAGNOSIS — I10 BENIGN ESSENTIAL HYPERTENSION: ICD-10-CM

## 2024-03-04 PROCEDURE — 3074F SYST BP LT 130 MM HG: CPT | Performed by: STUDENT IN AN ORGANIZED HEALTH CARE EDUCATION/TRAINING PROGRAM

## 2024-03-04 PROCEDURE — G0439 PPPS, SUBSEQ VISIT: HCPCS | Performed by: STUDENT IN AN ORGANIZED HEALTH CARE EDUCATION/TRAINING PROGRAM

## 2024-03-04 PROCEDURE — 99213 OFFICE O/P EST LOW 20 MIN: CPT | Performed by: STUDENT IN AN ORGANIZED HEALTH CARE EDUCATION/TRAINING PROGRAM

## 2024-03-04 PROCEDURE — 99397 PER PM REEVAL EST PAT 65+ YR: CPT | Performed by: STUDENT IN AN ORGANIZED HEALTH CARE EDUCATION/TRAINING PROGRAM

## 2024-03-04 PROCEDURE — 1160F RVW MEDS BY RX/DR IN RCRD: CPT | Performed by: STUDENT IN AN ORGANIZED HEALTH CARE EDUCATION/TRAINING PROGRAM

## 2024-03-04 PROCEDURE — 1123F ACP DISCUSS/DSCN MKR DOCD: CPT | Performed by: STUDENT IN AN ORGANIZED HEALTH CARE EDUCATION/TRAINING PROGRAM

## 2024-03-04 PROCEDURE — 1159F MED LIST DOCD IN RCRD: CPT | Performed by: STUDENT IN AN ORGANIZED HEALTH CARE EDUCATION/TRAINING PROGRAM

## 2024-03-04 PROCEDURE — 1170F FXNL STATUS ASSESSED: CPT | Performed by: STUDENT IN AN ORGANIZED HEALTH CARE EDUCATION/TRAINING PROGRAM

## 2024-03-04 PROCEDURE — 1158F ADVNC CARE PLAN TLK DOCD: CPT | Performed by: STUDENT IN AN ORGANIZED HEALTH CARE EDUCATION/TRAINING PROGRAM

## 2024-03-04 PROCEDURE — 1036F TOBACCO NON-USER: CPT | Performed by: STUDENT IN AN ORGANIZED HEALTH CARE EDUCATION/TRAINING PROGRAM

## 2024-03-04 PROCEDURE — 3008F BODY MASS INDEX DOCD: CPT | Performed by: STUDENT IN AN ORGANIZED HEALTH CARE EDUCATION/TRAINING PROGRAM

## 2024-03-04 PROCEDURE — 4010F ACE/ARB THERAPY RXD/TAKEN: CPT | Performed by: STUDENT IN AN ORGANIZED HEALTH CARE EDUCATION/TRAINING PROGRAM

## 2024-03-04 PROCEDURE — 3078F DIAST BP <80 MM HG: CPT | Performed by: STUDENT IN AN ORGANIZED HEALTH CARE EDUCATION/TRAINING PROGRAM

## 2024-03-04 ASSESSMENT — ACTIVITIES OF DAILY LIVING (ADL)
MANAGING_FINANCES: INDEPENDENT
GROCERY_SHOPPING: INDEPENDENT
TAKING_MEDICATION: INDEPENDENT
DOING_HOUSEWORK: INDEPENDENT
DRESSING: INDEPENDENT
BATHING: INDEPENDENT

## 2024-03-04 ASSESSMENT — PATIENT HEALTH QUESTIONNAIRE - PHQ9
SUM OF ALL RESPONSES TO PHQ9 QUESTIONS 1 AND 2: 0
1. LITTLE INTEREST OR PLEASURE IN DOING THINGS: NOT AT ALL
2. FEELING DOWN, DEPRESSED OR HOPELESS: NOT AT ALL

## 2024-03-04 NOTE — PATIENT INSTRUCTIONS
Please stop at the lab ([address]) to complete your blood and/or urine work that I've ordered for you.    I will contact you with the results at my soonest convenience. I strongly urge you to use [facility] as this is the quickest and easiest way to access your results and receive my correspondences.    I recommend conservative management (parsed down diet, OTC meds as needed) for your resolving loose stools.    Your medications are up to date today, I will renew them when a refill is due.     Follow up with your specialists as previously scheduled.    See me yearly for a Medicare Wellness Visit, and sooner as needed for sick visits

## 2024-03-04 NOTE — PROGRESS NOTES
Subjective   Reason for Visit: Santo Mcdaniel Jr. is an 73 y.o. male here for a Medicare Wellness visit.     Past Medical, Surgical, and Family History reviewed and updated in chart.    Reviewed all medications by prescribing practitioner or clinical pharmacist (such as prescriptions, OTCs, herbal therapies and supplements) and documented in the medical record.    HPI  Preventative, MWV, and GI discomfort are focus of today's appointment.     Re: Diarrhea - acute onset of loose stool. Around a few sick contacts with similar symptoms. Has been using conservative treatments and it's improving, one accident at onset as he couldn't get to toilet fast enough. Mild abd pain at first, this resolved. No bleeding. Colonoscopy in 2020 without any egregious findings.       MWV/CPE portion  Re: DM2 - A1c stable at 8.3%.   Follows with endocrinology (Rm) , pt opts to get his meds changed by this doctor. Reports better compliance with meds as of late. Patient denies symptomatic highs or lows with regards to their glucose. Reports no polyuria, polydipsia, fatigue, and stocking/glove neuropathy. Reports fair foot hygiene, denies knowledge of any foot ulcers or trauma. Sees eye doctor for vision issues.      Re: CYNDIE - good compliance with CPAP.     Re: obesity - Down ~10 lb  to 220 to 210lb. He attributes this to eating less; has experienced loss lately and hasn't been eating as much.      Re: HTN - a goal today. Reports no sx high or low from HTN; denies blurry vision, HA, dizziness LoC CP SoB Riojas and leg swelling       Re: HM - PSA no longer indicated. Colon screen UTD (due next 9/2025). Shots UTD.      PMHx, FHx, Social Hx, Surg Hx personally reviewed at this appointment. No pertinent findings and/or changes from prior (if applicable).     ROS: Denies wt gain/loss f/c HA LoC CP SOB NVDC. See HPI above, and scanned sheet (if applicable). All other systems are reviewed and are without complaint.     Patient Care Team:  Eliseo LINTON  "MD Sky as PCP - General (Internal Medicine)     Review of Systems    Objective   Vitals:  /71   Pulse 67   Temp 36.4 °C (97.6 °F)   Ht 1.702 m (5' 7\")   Wt 90.7 kg (200 lb)   SpO2 98%   BMI 31.32 kg/m²       Physical Exam  Gen: NAD. AAO x3.  HEENT: NC/AT. Anicteric sclera, symmetric pupils. MMM no thrush.  Neck: Soft, supple. No LAD. No goiter.  CV: RRR nl s1s2 no m/r/g  Pulm: CTAB no w/r/r, good air exchange  GI: soft NTND BS+ no hsm  Ext: WWP no edema  Neuro: II-XII grossly intact, nonfocal systemic findings  MSK: 5/5 strength b/l UE and LE  Gait: unremarkable   Feet: no ulcers, nl monofilament testing     Lab Results   Component Value Date    WBC 5.6 06/08/2022    HGB 13.7 06/08/2022    HCT 42.6 06/08/2022     06/08/2022    CHOL 133 06/08/2022    TRIG 94 06/08/2022    HDL 45.3 06/08/2022    ALT 10 06/08/2022    AST 13 06/08/2022     06/08/2022    K 4.2 06/08/2022     06/08/2022    CREATININE 1.13 06/08/2022    BUN 15 06/08/2022    CO2 28 06/08/2022    TSH 1.44 06/08/2022    HGBA1C 8.3 (A) 11/06/2023     par    ELECTROCARDIOGRAM RHYTHM STRIP  Ordered by an unspecified provider.     Assessment/Plan   # Loose stools: resolving  - conservative mgmt    -------  MWV/CPE    # DM2: 8.3%, follows with   - medication compliance talked about at great length today  - close follow up with endocrine  - Diabetic labs  - counselled on wt loss, diet, exercise, and lifestyle modifications  - yearly foot exams, eye exams      # HTN: at/near goal  - continue current regimen  - routine lab work if not recent  - continue lifestyle modifications     # CYNDIE on CPAP  - continue using CPAP      # Obesity: BMI > 30  - counselled on wt loss via diet, exercise, and other lifestyle modifications      # Health Maintenance  - routine blood work  - Colon Cancer Screening: UTD, repeat 2025  - PSA: pending (ordered by urology), no longer indicated for screening however  - Immunizations:  UTD  - AAA " screening: not indicated   .  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

## 2024-04-12 ENCOUNTER — TELEPHONE (OUTPATIENT)
Dept: ENDOCRINOLOGY | Facility: CLINIC | Age: 74
End: 2024-04-12
Payer: MEDICARE

## 2024-06-07 ENCOUNTER — LAB (OUTPATIENT)
Dept: LAB | Facility: LAB | Age: 74
End: 2024-06-07
Payer: MEDICARE

## 2024-06-07 ENCOUNTER — OFFICE VISIT (OUTPATIENT)
Dept: ENDOCRINOLOGY | Facility: CLINIC | Age: 74
End: 2024-06-07
Payer: MEDICARE

## 2024-06-07 VITALS — WEIGHT: 197 LBS | BODY MASS INDEX: 30.85 KG/M2

## 2024-06-07 DIAGNOSIS — E78.5 HYPERLIPIDEMIA, UNSPECIFIED HYPERLIPIDEMIA TYPE: ICD-10-CM

## 2024-06-07 DIAGNOSIS — E11.3511 TYPE 2 DIABETES MELLITUS WITH RIGHT EYE AFFECTED BY PROLIFERATIVE RETINOPATHY AND MACULAR EDEMA, WITHOUT LONG-TERM CURRENT USE OF INSULIN (MULTI): Primary | ICD-10-CM

## 2024-06-07 DIAGNOSIS — I10 BENIGN ESSENTIAL HYPERTENSION: ICD-10-CM

## 2024-06-07 DIAGNOSIS — E11.3511 TYPE 2 DIABETES MELLITUS WITH RIGHT EYE AFFECTED BY PROLIFERATIVE RETINOPATHY AND MACULAR EDEMA, WITHOUT LONG-TERM CURRENT USE OF INSULIN (MULTI): ICD-10-CM

## 2024-06-07 DIAGNOSIS — N40.0 BENIGN PROSTATIC HYPERPLASIA, UNSPECIFIED WHETHER LOWER URINARY TRACT SYMPTOMS PRESENT: ICD-10-CM

## 2024-06-07 LAB
ALBUMIN SERPL BCP-MCNC: 4.2 G/DL (ref 3.4–5)
ALP SERPL-CCNC: 60 U/L (ref 33–136)
ALT SERPL W P-5'-P-CCNC: 8 U/L (ref 10–52)
ANION GAP SERPL CALC-SCNC: 13 MMOL/L (ref 10–20)
AST SERPL W P-5'-P-CCNC: 14 U/L (ref 9–39)
BASOPHILS # BLD AUTO: 0.06 X10*3/UL (ref 0–0.1)
BASOPHILS NFR BLD AUTO: 1.1 %
BILIRUB SERPL-MCNC: 1.5 MG/DL (ref 0–1.2)
BUN SERPL-MCNC: 13 MG/DL (ref 6–23)
CALCIUM SERPL-MCNC: 10.1 MG/DL (ref 8.6–10.6)
CHLORIDE SERPL-SCNC: 101 MMOL/L (ref 98–107)
CHOLEST SERPL-MCNC: 115 MG/DL (ref 0–199)
CHOLESTEROL/HDL RATIO: 2.8
CO2 SERPL-SCNC: 29 MMOL/L (ref 21–32)
CREAT SERPL-MCNC: 1.01 MG/DL (ref 0.5–1.3)
EGFRCR SERPLBLD CKD-EPI 2021: 78 ML/MIN/1.73M*2
EOSINOPHIL # BLD AUTO: 0.1 X10*3/UL (ref 0–0.4)
EOSINOPHIL NFR BLD AUTO: 1.8 %
ERYTHROCYTE [DISTWIDTH] IN BLOOD BY AUTOMATED COUNT: 15.1 % (ref 11.5–14.5)
EST. AVERAGE GLUCOSE BLD GHB EST-MCNC: 128 MG/DL
GLUCOSE SERPL-MCNC: 96 MG/DL (ref 74–99)
HBA1C MFR BLD: 6.1 %
HCT VFR BLD AUTO: 41.9 % (ref 41–52)
HDLC SERPL-MCNC: 41 MG/DL
HGB BLD-MCNC: 13.1 G/DL (ref 13.5–17.5)
IMM GRANULOCYTES # BLD AUTO: 0.01 X10*3/UL (ref 0–0.5)
IMM GRANULOCYTES NFR BLD AUTO: 0.2 % (ref 0–0.9)
LDLC SERPL CALC-MCNC: 55 MG/DL
LYMPHOCYTES # BLD AUTO: 1.75 X10*3/UL (ref 0.8–3)
LYMPHOCYTES NFR BLD AUTO: 31 %
MCH RBC QN AUTO: 27.6 PG (ref 26–34)
MCHC RBC AUTO-ENTMCNC: 31.3 G/DL (ref 32–36)
MCV RBC AUTO: 88 FL (ref 80–100)
MONOCYTES # BLD AUTO: 0.49 X10*3/UL (ref 0.05–0.8)
MONOCYTES NFR BLD AUTO: 8.7 %
NEUTROPHILS # BLD AUTO: 3.23 X10*3/UL (ref 1.6–5.5)
NEUTROPHILS NFR BLD AUTO: 57.2 %
NON HDL CHOLESTEROL: 74 MG/DL (ref 0–149)
NRBC BLD-RTO: 0 /100 WBCS (ref 0–0)
PLATELET # BLD AUTO: 239 X10*3/UL (ref 150–450)
POTASSIUM SERPL-SCNC: 4 MMOL/L (ref 3.5–5.3)
PROT SERPL-MCNC: 7.7 G/DL (ref 6.4–8.2)
PSA SERPL-MCNC: 1.24 NG/ML
RBC # BLD AUTO: 4.74 X10*6/UL (ref 4.5–5.9)
SODIUM SERPL-SCNC: 139 MMOL/L (ref 136–145)
TRIGL SERPL-MCNC: 93 MG/DL (ref 0–149)
TSH SERPL-ACNC: 1.16 MIU/L (ref 0.44–3.98)
VLDL: 19 MG/DL (ref 0–40)
WBC # BLD AUTO: 5.6 X10*3/UL (ref 4.4–11.3)

## 2024-06-07 PROCEDURE — 83036 HEMOGLOBIN GLYCOSYLATED A1C: CPT

## 2024-06-07 PROCEDURE — 85025 COMPLETE CBC W/AUTO DIFF WBC: CPT

## 2024-06-07 PROCEDURE — 80061 LIPID PANEL: CPT

## 2024-06-07 PROCEDURE — 36415 COLL VENOUS BLD VENIPUNCTURE: CPT

## 2024-06-07 PROCEDURE — 1159F MED LIST DOCD IN RCRD: CPT | Performed by: INTERNAL MEDICINE

## 2024-06-07 PROCEDURE — 84153 ASSAY OF PSA TOTAL: CPT

## 2024-06-07 PROCEDURE — 84443 ASSAY THYROID STIM HORMONE: CPT

## 2024-06-07 PROCEDURE — 99214 OFFICE O/P EST MOD 30 MIN: CPT | Performed by: INTERNAL MEDICINE

## 2024-06-07 PROCEDURE — 4010F ACE/ARB THERAPY RXD/TAKEN: CPT | Performed by: INTERNAL MEDICINE

## 2024-06-07 PROCEDURE — 80053 COMPREHEN METABOLIC PANEL: CPT

## 2024-06-07 RX ORDER — PIOGLITAZONE HCL AND METFORMIN HCL 850; 15 MG/1; MG/1
TABLET ORAL
Qty: 90 TABLET | Refills: 2 | Status: SHIPPED | OUTPATIENT
Start: 2024-06-07 | End: 2024-06-10 | Stop reason: SDUPTHER

## 2024-06-07 NOTE — PROGRESS NOTES
Patient ID: Santo Mcdaniel Jr. is a 74 y.o. male who presents for Follow-up.  HPI  The patient comes in for follow up.    He has type 2 diabetes hypertension hyperlipidemia and sleep apnea.    He remains on a more balanced diet but has not been consistent.    He has not been exercising.    When he has checked blood sugars they have been low 100s.    He remains off of glimepiride and has had no low blood sugars.    He continues on pioglitazone/metformin 15/1000 mg twice daily Farxiga 10 mg.    He continues to be under stress since his girlfriend  last 2022.    Physically he has no other complaints.      ROS  Comprehensive review of systems is negative.    Objective   Physical Exam  There were no vitals taken for this visit.   Vitals:    24 0903   Weight: 89.4 kg (197 lb)      Body mass index is 30.85 kg/m².      Weight 197 down 8 pounds for a total of 49    ENT normal. No adenopathy  Fundi normal  Thyroid palpable and normal. No nodules  Chest clear to auscultation  Heart sounds are normal  Abdomen nontender. Bowel sounds normal. No organomegaly  Feet are okay  Normal vibration and monofilament sensation normal pulses, no lesions    Current Outpatient Medications   Medication Sig Dispense Refill    amLODIPine (Norvasc) 5 mg tablet Take 1 tablet (5 mg) by mouth once daily. 90 tablet 2    aspirin 81 mg EC tablet Take 1 tablet (81 mg) by mouth once daily.      blood sugar diagnostic (Contour Next Test Strips) strip TEST twice a day      dapagliflozin propanediol (Farxiga) 10 mg Take 1 tablet (10 mg) by mouth once daily. 90 tablet 3    ergocalciferol (Vitamin D-2) 1.25 MG (24200 UT) capsule once a week.      famotidine (Pepcid) 20 mg tablet Take by mouth.      latanoprost (Xalatan) 0.005 % ophthalmic solution USE 1 DROP IN BOTH EYES DAILY AT BEDTIME.      olmesartan (BENIcar) 20 mg tablet Take 1 tablet (20 mg) by mouth once daily. 90 tablet 2    omeprazole (PriLOSEC) 20 mg DR capsule Take by mouth.       pioglitazone-metformin (ACTOPlus Met)  mg tablet Once daily 90 tablet 2    potassium chloride CR 10 mEq ER tablet twice a day.      rosuvastatin (Crestor) 10 mg tablet Take 1 tablet (10 mg) by mouth once daily. 90 tablet 3    sildenafil (Viagra) 100 mg tablet Take by mouth.      tadalafil (Cialis) 10 mg tablet Take 1 tablet (10 mg) by mouth once daily as needed for erectile dysfunction. 12 tablet 0    tadalafil (Cialis) 5 mg tablet Take 1 tablet (5 mg) by mouth once daily. 30 tablet 2    timolol/brimonidin/dorzolam/PF (timoloL-brimonidi-dorzolam,PF,) 0.5-0.15-2 % drops Administer into affected eye(s) once daily.       No current facility-administered medications for this visit.       Assessment/Plan     1.  Type 2 diabetes  2.  Hypertension  3.  Hyperlipidemia  4.  Sleep apnea    Will check hemoglobin A1c and TSH today.  He will also be getting blood work done by his primary care doctor.    He will work on diet and exercise.    We again discussed adding a DPP 4 inhibitor in if his sugars are not coming down.    He will follow-up with me in 6 months sooner as needed.

## 2024-06-10 ENCOUNTER — TELEPHONE (OUTPATIENT)
Dept: ENDOCRINOLOGY | Facility: CLINIC | Age: 74
End: 2024-06-10
Payer: MEDICARE

## 2024-06-10 DIAGNOSIS — E11.3511 TYPE 2 DIABETES MELLITUS WITH RIGHT EYE AFFECTED BY PROLIFERATIVE RETINOPATHY AND MACULAR EDEMA, WITHOUT LONG-TERM CURRENT USE OF INSULIN (MULTI): ICD-10-CM

## 2024-06-10 DIAGNOSIS — E78.5 HYPERLIPIDEMIA, UNSPECIFIED HYPERLIPIDEMIA TYPE: ICD-10-CM

## 2024-06-10 DIAGNOSIS — I10 BENIGN ESSENTIAL HYPERTENSION: ICD-10-CM

## 2024-06-10 RX ORDER — PIOGLITAZONE HCL AND METFORMIN HCL 850; 15 MG/1; MG/1
TABLET ORAL
Qty: 60 TABLET | Refills: 11 | Status: SHIPPED | OUTPATIENT
Start: 2024-06-10

## 2024-06-17 ENCOUNTER — APPOINTMENT (OUTPATIENT)
Dept: PRIMARY CARE | Facility: CLINIC | Age: 74
End: 2024-06-17
Payer: MEDICARE

## 2024-06-17 VITALS
HEIGHT: 68 IN | TEMPERATURE: 96.3 F | WEIGHT: 192 LBS | SYSTOLIC BLOOD PRESSURE: 123 MMHG | HEART RATE: 63 BPM | BODY MASS INDEX: 29.1 KG/M2 | DIASTOLIC BLOOD PRESSURE: 77 MMHG | OXYGEN SATURATION: 96 %

## 2024-06-17 DIAGNOSIS — E11.3511 TYPE 2 DIABETES MELLITUS WITH RIGHT EYE AFFECTED BY PROLIFERATIVE RETINOPATHY AND MACULAR EDEMA, WITHOUT LONG-TERM CURRENT USE OF INSULIN (MULTI): ICD-10-CM

## 2024-06-17 DIAGNOSIS — G47.33 OBSTRUCTIVE SLEEP APNEA SYNDROME: ICD-10-CM

## 2024-06-17 DIAGNOSIS — E78.5 HYPERLIPIDEMIA, UNSPECIFIED HYPERLIPIDEMIA TYPE: ICD-10-CM

## 2024-06-17 DIAGNOSIS — I10 BENIGN ESSENTIAL HYPERTENSION: Primary | ICD-10-CM

## 2024-06-17 PROCEDURE — 3074F SYST BP LT 130 MM HG: CPT | Performed by: STUDENT IN AN ORGANIZED HEALTH CARE EDUCATION/TRAINING PROGRAM

## 2024-06-17 PROCEDURE — 4010F ACE/ARB THERAPY RXD/TAKEN: CPT | Performed by: STUDENT IN AN ORGANIZED HEALTH CARE EDUCATION/TRAINING PROGRAM

## 2024-06-17 PROCEDURE — 3048F LDL-C <100 MG/DL: CPT | Performed by: STUDENT IN AN ORGANIZED HEALTH CARE EDUCATION/TRAINING PROGRAM

## 2024-06-17 PROCEDURE — 1036F TOBACCO NON-USER: CPT | Performed by: STUDENT IN AN ORGANIZED HEALTH CARE EDUCATION/TRAINING PROGRAM

## 2024-06-17 PROCEDURE — 1126F AMNT PAIN NOTED NONE PRSNT: CPT | Performed by: STUDENT IN AN ORGANIZED HEALTH CARE EDUCATION/TRAINING PROGRAM

## 2024-06-17 PROCEDURE — 3078F DIAST BP <80 MM HG: CPT | Performed by: STUDENT IN AN ORGANIZED HEALTH CARE EDUCATION/TRAINING PROGRAM

## 2024-06-17 PROCEDURE — 3044F HG A1C LEVEL LT 7.0%: CPT | Performed by: STUDENT IN AN ORGANIZED HEALTH CARE EDUCATION/TRAINING PROGRAM

## 2024-06-17 PROCEDURE — 99214 OFFICE O/P EST MOD 30 MIN: CPT | Performed by: STUDENT IN AN ORGANIZED HEALTH CARE EDUCATION/TRAINING PROGRAM

## 2024-06-17 ASSESSMENT — PAIN SCALES - GENERAL: PAINLEVEL: 0-NO PAIN

## 2024-06-17 NOTE — PATIENT INSTRUCTIONS
Your blood work is up to date; no need for additional draw at this appointment    I previously renewed your medications in advance of today's appointment.     I recommend the following shots at the pharmacy; RSV, Tetanus, and Shingles.    See me in 3 months.

## 2024-06-17 NOTE — PROGRESS NOTES
"Subjective   Patient ID: Santo Mcdaniel Jr. is a 74 y.o. male who presents for No chief complaint on file..    HPI   Doing great since last in (3/2024)    Re: DM2 - A1c with remarkable improvement to 6.1%.   Follows with endocrinology (Rm). Reports better compliance with meds as of late. Patient denies symptomatic highs or lows with regards to their glucose. Reports no polyuria, polydipsia, fatigue, and stocking/glove neuropathy. Reports fair foot hygiene, denies knowledge of any foot ulcers or trauma. Sees eye doctor for vision issues.      Re: CYNDIE - good compliance with CPAP.     Re: obesity - Improvement in weight; down to 192lb now.      Re: HTN - a goal today. Reports no sx high or low from HTN; denies blurry vision, HA, dizziness LoC CP SoB Riojas and leg swelling       Re: HM - PSA current. Colon screen UTD (due next 9/2025). Shots due at pharmacy.      PMHx, FHx, Social Hx, Surg Hx personally reviewed at this appointment. No pertinent findings and/or changes from prior (if applicable).     ROS: Denies wt gain/loss f/c HA LoC CP SOB NVDC. See HPI above, and scanned sheet (if applicable). All other systems are reviewed and are without complaint.     Review of Systems    Objective   /77   Pulse 63   Temp 35.7 °C (96.3 °F)   Ht 1.715 m (5' 7.5\")   Wt 87.1 kg (192 lb)   SpO2 96%   BMI 29.63 kg/m²     Physical Exam  Gen: NAD. AAO x3.  HEENT: NC/AT. Anicteric sclera, symmetric pupils. MMM no thrush.  Neck: Soft, supple. No LAD. No goiter.  CV: RRR nl s1s2 no m/r/g  Pulm: CTAB no w/r/r, good air exchange  GI: soft NTND BS+ no hsm  Ext: WWP no edema  Neuro: II-XII grossly intact, nonfocal systemic findings  MSK: 5/5 strength b/l UE and LE  Gait: unremarkable   Feet: no ulcers, nl monofilament testing     Lab Results   Component Value Date    WBC 5.6 06/07/2024    HGB 13.1 (L) 06/07/2024    HCT 41.9 06/07/2024     06/07/2024    CHOL 115 06/07/2024    TRIG 93 06/07/2024    HDL 41.0 06/07/2024    ALT 8 " (L) 06/07/2024    AST 14 06/07/2024     06/07/2024    K 4.0 06/07/2024     06/07/2024    CREATININE 1.01 06/07/2024    BUN 13 06/07/2024    CO2 29 06/07/2024    TSH 1.16 06/07/2024    PSA 1.24 06/07/2024    HGBA1C 6.1 (H) 06/07/2024     par    ELECTROCARDIOGRAM RHYTHM STRIP  Ordered by an unspecified provider.       Assessment/Plan   Improvement since last in with regards to weight and DM2 control.     # DM2: Stable, A1c at/near goal  - continue current meds  - Routine Diabetic labs  - counselled on wt loss, diet, exercise, and lifestyle modifications  - yearly foot exams, eye exams     # HTN: at/near goal  - continue current regimen  - routine lab work if not recent  - continue lifestyle modifications     # CYNDIE on CPAP  - continue using CPAP      # BMI: 29  - counselled on wt loss via diet, exercise, and other lifestyle modifications      # Health Maintenance  - routine blood work  - Colon Cancer Screening: UTD, repeat 2025  - PSA: UTD  - Immunizations:  RSV, TDAP, VZV at pharmacy  - AAA screening: not indicated   .

## 2024-06-17 NOTE — PROGRESS NOTES
"Subjective   Patient ID: Santo Mcdaniel Jr. is a 74 y.o. male who presents for No chief complaint on file..    HPI   Doing great since last in (3/2024)    Re: DM2 - A1c with remarkable improvement to 6.1%.   Follows with endocrinology (Rm). Reports better compliance with meds as of late. Patient denies symptomatic highs or lows with regards to their glucose. Reports no polyuria, polydipsia, fatigue, and stocking/glove neuropathy. Reports fair foot hygiene, denies knowledge of any foot ulcers or trauma. Sees eye doctor for vision issues.      Re: CYNDIE - good compliance with CPAP.     Re: obesity - Improvement in weight; down to 192lb now.      Re: HTN - a goal today. Reports no sx high or low from HTN; denies blurry vision, HA, dizziness LoC CP SoB [place]jas and leg swelling       Re: HM - PSA current. Colon screen UTD (due next 9/2025). Shots due at pharmacy.      PMHx, FHx, Social Hx, Surg Hx personally reviewed at this appointment. No pertinent findings and/or changes from prior (if applicable).     ROS: Denies wt gain/loss f/c HA LoC CP SOB NVDC. See HPI above, and scanned sheet (if applicable). All other systems are reviewed and are without complaint.     Review of Systems    Objective   /77   Pulse 63   Temp 35.7 °C (96.3 °F)   Ht 1.715 m (5' 7.5\")   Wt 87.1 kg (192 lb)   SpO2 96%   BMI 29.63 kg/m²     Physical Exam  Gen: NAD. AAO x3.  HEENT: NC/AT. Anicteric sclera, symmetric pupils. MMM no thrush.  Neck: Soft, supple. No LAD. No goiter.  CV: RRR nl s1s2 no m/r/g  Pulm: CTAB no w/r/r, good air exchange  GI: soft NTND BS+ no hsm  Ext: WWP no edema  Neuro: II-XII grossly intact, nonfocal systemic findings  MSK: 5/5 strength b/l UE and LE  Gait: unremarkable   Feet: no ulcers, nl monofilament testing     Lab Results   Component Value Date    WBC 5.6 06/07/2024    HGB 13.1 (L) 06/07/2024    HCT 41.9 06/07/2024     06/07/2024    CHOL 115 06/07/2024    TRIG 93 06/07/2024    HDL 41.0 06/07/2024    ALT 8 " (L) 06/07/2024    AST 14 06/07/2024     06/07/2024    K 4.0 06/07/2024     06/07/2024    CREATININE 1.01 06/07/2024    BUN 13 06/07/2024    CO2 29 06/07/2024    TSH 1.16 06/07/2024    PSA 1.24 06/07/2024    HGBA1C 6.1 (H) 06/07/2024     par    ELECTROCARDIOGRAM RHYTHM STRIP  Ordered by an unspecified provider.       Assessment/Plan   Improvement since last in with regards to weight and DM2 control.     # DM2: Stable, A1c at/near goal  - continue current meds  - Routine Diabetic labs  - counselled on wt loss, diet, exercise, and lifestyle modifications  - yearly foot exams, eye exams     # HTN: at/near goal  - continue current regimen  - routine lab work if not recent  - continue lifestyle modifications     # CYNDIE on CPAP  - continue using CPAP      # BMI: 29  - counselled on wt loss via diet, exercise, and other lifestyle modifications      # Health Maintenance  - routine blood work  - Colon Cancer Screening: UTD, repeat 2025  - PSA: UTD  - Immunizations:  RSV, TDAP, VZV at pharmacy  - AAA screening: not indicated   .  {Assess/[email or web address]:58243}

## 2024-07-17 ENCOUNTER — OFFICE VISIT (OUTPATIENT)
Dept: UROLOGY | Facility: HOSPITAL | Age: 74
End: 2024-07-17
Payer: MEDICARE

## 2024-07-17 DIAGNOSIS — N40.1 BENIGN PROSTATIC HYPERPLASIA WITH WEAK URINARY STREAM: ICD-10-CM

## 2024-07-17 DIAGNOSIS — R39.12 BENIGN PROSTATIC HYPERPLASIA WITH WEAK URINARY STREAM: ICD-10-CM

## 2024-07-17 DIAGNOSIS — N52.9 ERECTILE DISORDER: Primary | ICD-10-CM

## 2024-07-17 PROCEDURE — 4010F ACE/ARB THERAPY RXD/TAKEN: CPT | Performed by: STUDENT IN AN ORGANIZED HEALTH CARE EDUCATION/TRAINING PROGRAM

## 2024-07-17 PROCEDURE — 1159F MED LIST DOCD IN RCRD: CPT | Performed by: STUDENT IN AN ORGANIZED HEALTH CARE EDUCATION/TRAINING PROGRAM

## 2024-07-17 PROCEDURE — 99214 OFFICE O/P EST MOD 30 MIN: CPT | Performed by: STUDENT IN AN ORGANIZED HEALTH CARE EDUCATION/TRAINING PROGRAM

## 2024-07-17 PROCEDURE — 3048F LDL-C <100 MG/DL: CPT | Performed by: STUDENT IN AN ORGANIZED HEALTH CARE EDUCATION/TRAINING PROGRAM

## 2024-07-17 PROCEDURE — 3044F HG A1C LEVEL LT 7.0%: CPT | Performed by: STUDENT IN AN ORGANIZED HEALTH CARE EDUCATION/TRAINING PROGRAM

## 2024-07-17 RX ORDER — TADALAFIL 10 MG/1
10 TABLET ORAL AS NEEDED
Qty: 15 TABLET | Refills: 3 | Status: SHIPPED | OUTPATIENT
Start: 2024-07-17 | End: 2024-08-16

## 2024-07-17 RX ORDER — TADALAFIL 5 MG/1
5 TABLET ORAL DAILY
Qty: 30 TABLET | Refills: 3 | Status: SHIPPED | OUTPATIENT
Start: 2024-07-17 | End: 2024-11-14

## 2024-07-17 NOTE — PROGRESS NOTES
Subjective   Patient ID: Santo Mcdaniel Jr. is a 74 y.o. male    HPI  74 y.o. male who is here for a follow-up visit regarding his PSA levels and to refill his Tadalafil prescription. The patient's recent blood test for prostate cancer showed a normal PSA level, indicating no concern for prostate cancer at this time. The patient has not yet started taking Tadalafil due to being out of town and having a busy schedule. He is advised to start the medication as prescribed: 5 mg daily and 10 mg as needed. The patient requested a printout of the medication instructions and a [employer] card for cost savings.    The most recent PSA, conducted on 6/7/2024, revealed:  1.24 ng/ml       Review of Systems    All systems were reviewed. Anything negative was noted in the HPI.    Objective   Physical Exam    General: Well developed, well nourished, alert and cooperative, appears in no acute distress   Eyes: Non-injected conjunctiva, sclera clear, no proptosis   Cardiac: Extremities are warm and well perfused. No edema, cyanosis or pallor   Lungs: Breathing is easy, non-labored. Speaking in clear and complete sentences. Normal diaphragmatic movement   MSK: Ambulatory with steady gait, unassisted   Neuro: Alert and oriented to person, place, and time   Psych: Demonstrates good judgment and reason, without hallucinations, abnormal affect or abnormal behaviors   Skin: No obvious lesions, no rashes       No CVA tenderness bilaterally   No suprapubic pain or discomfort       Past Medical History:   Diagnosis Date    Epigastric pain 02/18/2021    Dyspepsia    Other conditions influencing health status 08/17/2016    Complete Colonoscopy    Personal history of diseases of the blood and blood-forming organs and certain disorders involving the immune mechanism 08/23/2019    History of anemia    Personal history of other benign neoplasm 04/14/2020    History of other benign neoplasm         Past Surgical History:   Procedure Laterality Date     APPENDECTOMY  08/15/2014    Appendectomy           Assessment/Plan   Erectile Dysfunction, Normal PSA    74 y.o. male who presents for the above condition, We had a very long and extensive discussion with the patient regarding the pathophysiology, differential diagnosis, risk factor, and management of ED. We discussed at length mechanism of action, risk, benefit, potential complication, adverse events of PDE 5 inhibitors in the form of Tadalafil 5 mg/day and 10 mg as needed. Instructed the patient to stop the medication in case of any side effects.      Plan:  - Refill Tadalafil  - Follow up in 1 year with PSA        7/17/2024    Carlos Attestation  By signing my name below, I, Carlos Turner   attest that this documentation has been prepared under the direction and in the presence of Dr. Suraj Rene

## 2024-08-05 DIAGNOSIS — E11.9 TYPE 2 DIABETES MELLITUS WITHOUT COMPLICATION, WITHOUT LONG-TERM CURRENT USE OF INSULIN (MULTI): ICD-10-CM

## 2024-08-05 DIAGNOSIS — E78.5 HYPERLIPIDEMIA, UNSPECIFIED HYPERLIPIDEMIA TYPE: ICD-10-CM

## 2024-08-05 RX ORDER — ROSUVASTATIN CALCIUM 10 MG/1
10 TABLET, COATED ORAL DAILY
Qty: 90 TABLET | Refills: 2 | Status: SHIPPED | OUTPATIENT
Start: 2024-08-05

## 2024-08-29 DIAGNOSIS — E11.3511 TYPE 2 DIABETES MELLITUS WITH RIGHT EYE AFFECTED BY PROLIFERATIVE RETINOPATHY AND MACULAR EDEMA, WITHOUT LONG-TERM CURRENT USE OF INSULIN (MULTI): ICD-10-CM

## 2024-08-29 DIAGNOSIS — E78.5 HYPERLIPIDEMIA, UNSPECIFIED HYPERLIPIDEMIA TYPE: ICD-10-CM

## 2024-08-29 DIAGNOSIS — I10 BENIGN ESSENTIAL HYPERTENSION: ICD-10-CM

## 2024-08-29 RX ORDER — PIOGLITAZONE HCL AND METFORMIN HCL 850; 15 MG/1; MG/1
TABLET ORAL
Qty: 90 TABLET | Refills: 2 | Status: SHIPPED | OUTPATIENT
Start: 2024-08-29

## 2024-08-29 RX ORDER — PIOGLITAZONE HCL AND METFORMIN HCL 850; 15 MG/1; MG/1
TABLET ORAL
Qty: 60 TABLET | Refills: 11 | Status: SHIPPED | OUTPATIENT
Start: 2024-08-29

## 2024-09-17 ENCOUNTER — APPOINTMENT (OUTPATIENT)
Dept: PRIMARY CARE | Facility: CLINIC | Age: 74
End: 2024-09-17
Payer: MEDICARE

## 2024-09-24 ENCOUNTER — APPOINTMENT (OUTPATIENT)
Dept: PRIMARY CARE | Facility: CLINIC | Age: 74
End: 2024-09-24
Payer: MEDICARE

## 2024-09-24 VITALS
BODY MASS INDEX: 28.19 KG/M2 | WEIGHT: 186 LBS | DIASTOLIC BLOOD PRESSURE: 68 MMHG | SYSTOLIC BLOOD PRESSURE: 127 MMHG | HEART RATE: 59 BPM | HEIGHT: 68 IN | OXYGEN SATURATION: 98 % | TEMPERATURE: 97.3 F

## 2024-09-24 DIAGNOSIS — E11.3511 TYPE 2 DIABETES MELLITUS WITH RIGHT EYE AFFECTED BY PROLIFERATIVE RETINOPATHY AND MACULAR EDEMA, WITHOUT LONG-TERM CURRENT USE OF INSULIN: ICD-10-CM

## 2024-09-24 DIAGNOSIS — E78.5 HYPERLIPIDEMIA, UNSPECIFIED HYPERLIPIDEMIA TYPE: Primary | ICD-10-CM

## 2024-09-24 DIAGNOSIS — G47.33 OBSTRUCTIVE SLEEP APNEA SYNDROME: ICD-10-CM

## 2024-09-24 DIAGNOSIS — I10 BENIGN ESSENTIAL HYPERTENSION: ICD-10-CM

## 2024-09-24 DIAGNOSIS — E66.3 OVERWEIGHT (BMI 25.0-29.9): ICD-10-CM

## 2024-09-24 LAB — POC HEMOGLOBIN A1C: 6.1 % (ref 4.2–6.5)

## 2024-09-24 PROCEDURE — 3048F LDL-C <100 MG/DL: CPT | Performed by: STUDENT IN AN ORGANIZED HEALTH CARE EDUCATION/TRAINING PROGRAM

## 2024-09-24 PROCEDURE — 3074F SYST BP LT 130 MM HG: CPT | Performed by: STUDENT IN AN ORGANIZED HEALTH CARE EDUCATION/TRAINING PROGRAM

## 2024-09-24 PROCEDURE — 3044F HG A1C LEVEL LT 7.0%: CPT | Performed by: STUDENT IN AN ORGANIZED HEALTH CARE EDUCATION/TRAINING PROGRAM

## 2024-09-24 PROCEDURE — 1126F AMNT PAIN NOTED NONE PRSNT: CPT | Performed by: STUDENT IN AN ORGANIZED HEALTH CARE EDUCATION/TRAINING PROGRAM

## 2024-09-24 PROCEDURE — 1036F TOBACCO NON-USER: CPT | Performed by: STUDENT IN AN ORGANIZED HEALTH CARE EDUCATION/TRAINING PROGRAM

## 2024-09-24 PROCEDURE — 83036 HEMOGLOBIN GLYCOSYLATED A1C: CPT | Performed by: STUDENT IN AN ORGANIZED HEALTH CARE EDUCATION/TRAINING PROGRAM

## 2024-09-24 PROCEDURE — 99214 OFFICE O/P EST MOD 30 MIN: CPT | Performed by: STUDENT IN AN ORGANIZED HEALTH CARE EDUCATION/TRAINING PROGRAM

## 2024-09-24 PROCEDURE — 1159F MED LIST DOCD IN RCRD: CPT | Performed by: STUDENT IN AN ORGANIZED HEALTH CARE EDUCATION/TRAINING PROGRAM

## 2024-09-24 PROCEDURE — 1123F ACP DISCUSS/DSCN MKR DOCD: CPT | Performed by: STUDENT IN AN ORGANIZED HEALTH CARE EDUCATION/TRAINING PROGRAM

## 2024-09-24 PROCEDURE — 3078F DIAST BP <80 MM HG: CPT | Performed by: STUDENT IN AN ORGANIZED HEALTH CARE EDUCATION/TRAINING PROGRAM

## 2024-09-24 PROCEDURE — 4010F ACE/ARB THERAPY RXD/TAKEN: CPT | Performed by: STUDENT IN AN ORGANIZED HEALTH CARE EDUCATION/TRAINING PROGRAM

## 2024-09-24 PROCEDURE — 90662 IIV NO PRSV INCREASED AG IM: CPT | Performed by: STUDENT IN AN ORGANIZED HEALTH CARE EDUCATION/TRAINING PROGRAM

## 2024-09-24 PROCEDURE — G0008 ADMIN INFLUENZA VIRUS VAC: HCPCS | Performed by: STUDENT IN AN ORGANIZED HEALTH CARE EDUCATION/TRAINING PROGRAM

## 2024-09-24 PROCEDURE — 1160F RVW MEDS BY RX/DR IN RCRD: CPT | Performed by: STUDENT IN AN ORGANIZED HEALTH CARE EDUCATION/TRAINING PROGRAM

## 2024-09-24 PROCEDURE — 3008F BODY MASS INDEX DOCD: CPT | Performed by: STUDENT IN AN ORGANIZED HEALTH CARE EDUCATION/TRAINING PROGRAM

## 2024-09-24 RX ORDER — OLMESARTAN MEDOXOMIL 20 MG/1
20 TABLET ORAL DAILY
Qty: 90 TABLET | Refills: 3 | Status: SHIPPED | OUTPATIENT
Start: 2024-09-24

## 2024-09-24 RX ORDER — DAPAGLIFLOZIN 10 MG/1
10 TABLET, FILM COATED ORAL
Qty: 90 TABLET | Refills: 3 | Status: SHIPPED | OUTPATIENT
Start: 2024-09-24

## 2024-09-24 RX ORDER — AMLODIPINE BESYLATE 5 MG/1
5 TABLET ORAL
Qty: 90 TABLET | Refills: 3 | Status: SHIPPED | OUTPATIENT
Start: 2024-09-24

## 2024-09-24 ASSESSMENT — PAIN SCALES - GENERAL: PAINLEVEL: 0-NO PAIN

## 2024-09-24 NOTE — PROGRESS NOTES
"Subjective   Patient ID: Santo Mcdaniel Jr. is a 74 y.o. male who presents for No chief complaint on file..    HPI   Doing well since last in.    Re: DM2 - A1c with continued stability/improvement, now at  6.1%.   Follows with endocrinology (Rm). Reports better compliance with meds as of late. Patient denies symptomatic highs or lows with regards to their glucose. Reports no polyuria, polydipsia, fatigue, and stocking/glove neuropathy. Reports fair foot hygiene, denies knowledge of any foot ulcers or trauma. Sees eye doctor for vision issues.     Re: HTN - a goal today. Reports no sx high or low from HTN; denies blurry vision, HA, dizziness LoC CP SoB [place]jas and leg swelling      Re: CYNDIE - Excellent compliance with CPAP; using this nightly.      Re: obesity - Down another 6 pounds since last in, now at 186 lb.      Re: HM - PSA current. Colon screen UTD (due next 9/2025). Flu shot today.      PMHx, FHx, Social Hx, Surg Hx personally reviewed at this appointment. No pertinent findings and/or changes from prior (if applicable).     ROS: Denies wt gain/loss f/c HA LoC CP SOB NVDC. See HPI above, and scanned sheet (if applicable). All other systems are reviewed and are without complaint.      Review of Systems    Objective   /68   Pulse 59   Temp 36.3 °C (97.3 °F)   Ht 1.715 m (5' 7.5\")   Wt 84.4 kg (186 lb)   SpO2 98%   BMI 28.70 kg/m²     Physical Exam  Gen: NAD. AAO x3.  HEENT: NC/AT. Anicteric sclera, symmetric pupils. MMM no thrush.  Neck: Soft, supple. No LAD. No goiter.  CV: RRR nl s1s2 no m/r/g  Pulm: CTAB no w/r/r, good air exchange  GI: soft NTND BS+ no hsm  Ext: WWP no edema  Neuro: II-XII grossly intact, nonfocal systemic findings  MSK: 5/5 strength b/l UE and LE  Gait: unremarkable   Feet: no ulcers, nl monofilament testing . Chronic onychomycosis.      Lab Results   Component Value Date    WBC 5.6 06/07/2024    HGB 13.1 (L) 06/07/2024    HCT 41.9 06/07/2024     06/07/2024    CHOL 115 " 06/07/2024    TRIG 93 06/07/2024    HDL 41.0 06/07/2024    ALT 8 (L) 06/07/2024    AST 14 06/07/2024     06/07/2024    K 4.0 06/07/2024     06/07/2024    CREATININE 1.01 06/07/2024    BUN 13 06/07/2024    CO2 29 06/07/2024    TSH 1.16 06/07/2024    PSA 1.24 06/07/2024    HGBA1C 6.1 09/24/2024     par    ELECTROCARDIOGRAM RHYTHM STRIP  Ordered by an unspecified provider.      Current Outpatient Medications   Medication Instructions    amLODIPine (NORVASC) 5 mg, oral, Daily RT    aspirin 81 mg, oral, Daily RT    blood sugar diagnostic (Contour Next Test Strips) strip TEST twice a day    dapagliflozin propanediol (FARXIGA) 10 mg, oral, Daily RT    ergocalciferol (Vitamin D-2) 1.25 MG (20248 UT) capsule Weekly    famotidine (Pepcid) 20 mg tablet oral    latanoprost (Xalatan) 0.005 % ophthalmic solution USE 1 DROP IN BOTH EYES DAILY AT BEDTIME.    olmesartan (BENICAR) 20 mg, oral, Daily    omeprazole (PriLOSEC) 20 mg DR capsule oral    pioglitazone-metformin (ACTOPlus Met)  mg tablet TAKE 1 TABLET BY MOUTH EVERY DAY    pioglitazone-metformin (ACTOPlus Met)  mg tablet One twice daily    potassium chloride CR 10 mEq ER tablet 2 times daily    rosuvastatin (CRESTOR) 10 mg, oral, Daily    sildenafil (Viagra) 100 mg tablet oral    tadalafil (CIALIS) 5 mg, oral, Daily    tadalafil (CIALIS) 10 mg, oral, Daily PRN    tadalafil (CIALIS) 10 mg, oral, As needed    tadalafil (CIALIS) 5 mg, oral, Daily    timolol/brimonidin/dorzolam/PF (timoloL-brimonidi-dorzolam,PF,) 0.5-0.15-2 % drops ophthalmic (eye), Daily RT        Assessment/Plan   Continued stability/improvement since last in with regards to weight and DM2 control.      # DM2: Stable, A1c at/near goal  - continue current meds  - Routine Diabetic labs  - counselled on wt loss, diet, exercise, and lifestyle modifications  - yearly foot exams, eye exams      # HTN: at/near goal  - continue current regimen  - routine lab work if not recent  - continue  lifestyle modifications     # CYNDIE on CPAP  - continue using CPAP      # BMI: 28  - counselled on wt loss via diet, exercise, and other lifestyle modifications      # Health Maintenance  - routine blood work  - Colon Cancer Screening: UTD, repeat 2025  - PSA: UTD  - Immunizations:  Flu shot today. RSV, TDAP, VZV at pharmacy  - AAA screening: not indicated

## 2024-10-09 ENCOUNTER — TELEPHONE (OUTPATIENT)
Dept: PHARMACY | Facility: HOSPITAL | Age: 74
End: 2024-10-09
Payer: MEDICARE

## 2024-10-09 NOTE — TELEPHONE ENCOUNTER
Population Health: Outreach by Ambulatory Pharmacy Team    Patient: Santo Mcdaniel .  Primary Care Provider (PCP): Eliseo Swanson MD  Payor: [employer] GATICA  Reason: Adherence  Medication(s): Rosuvastatin 10mg  Outcome: Left Voicemail    Karen Sharp  Pharmacy Resident

## 2024-10-09 NOTE — TELEPHONE ENCOUNTER
I reviewed the progress note and agree with the resident’s findings and plans as written. Case discussed with resident.    Casandra Nguyen, PharmD

## 2024-10-09 NOTE — TELEPHONE ENCOUNTER
Population Health: Outreach by Ambulatory Pharmacy Team    Patient: Santo Mcdaniel .  Primary Care Provider (PCP): Eliseo Swanson MD  Payor: [employer]ank GATICA  Reason: Adherence  Medication(s): Rosuvastatin 10mg  Outcome: Patient Reached: Claims Adherence, states he uses 1 tablet once daily and receives from [facility], not [facility]. Patient has enough medication at home, and may need help in the future with Farxiga.     Karen Sharp  Pharmacy Resident

## 2024-10-22 NOTE — TELEPHONE ENCOUNTER
I reviewed the progress note and agree with the resident’s findings and plans as written. Case discussed with resident.    Clara Timmons, MayteD       Yes

## 2024-11-26 DIAGNOSIS — I10 BENIGN ESSENTIAL HYPERTENSION: ICD-10-CM

## 2024-11-26 RX ORDER — OLMESARTAN MEDOXOMIL 20 MG/1
20 TABLET ORAL DAILY
Qty: 90 TABLET | Refills: 3 | Status: SHIPPED | OUTPATIENT
Start: 2024-11-26

## 2024-12-06 ENCOUNTER — APPOINTMENT (OUTPATIENT)
Dept: ENDOCRINOLOGY | Facility: CLINIC | Age: 74
End: 2024-12-06
Payer: MEDICARE

## 2024-12-06 VITALS — BODY MASS INDEX: 27.16 KG/M2 | SYSTOLIC BLOOD PRESSURE: 124 MMHG | DIASTOLIC BLOOD PRESSURE: 78 MMHG | WEIGHT: 176 LBS

## 2024-12-06 DIAGNOSIS — E11.3511 TYPE 2 DIABETES MELLITUS WITH RIGHT EYE AFFECTED BY PROLIFERATIVE RETINOPATHY AND MACULAR EDEMA, WITHOUT LONG-TERM CURRENT USE OF INSULIN: Primary | ICD-10-CM

## 2024-12-06 DIAGNOSIS — I10 BENIGN ESSENTIAL HYPERTENSION: ICD-10-CM

## 2024-12-06 DIAGNOSIS — E78.5 HYPERLIPIDEMIA, UNSPECIFIED HYPERLIPIDEMIA TYPE: ICD-10-CM

## 2024-12-06 PROCEDURE — 3074F SYST BP LT 130 MM HG: CPT | Performed by: INTERNAL MEDICINE

## 2024-12-06 PROCEDURE — 3078F DIAST BP <80 MM HG: CPT | Performed by: INTERNAL MEDICINE

## 2024-12-06 PROCEDURE — 3044F HG A1C LEVEL LT 7.0%: CPT | Performed by: INTERNAL MEDICINE

## 2024-12-06 PROCEDURE — 99214 OFFICE O/P EST MOD 30 MIN: CPT | Performed by: INTERNAL MEDICINE

## 2024-12-06 PROCEDURE — 3048F LDL-C <100 MG/DL: CPT | Performed by: INTERNAL MEDICINE

## 2024-12-06 PROCEDURE — 1123F ACP DISCUSS/DSCN MKR DOCD: CPT | Performed by: INTERNAL MEDICINE

## 2024-12-06 PROCEDURE — 4010F ACE/ARB THERAPY RXD/TAKEN: CPT | Performed by: INTERNAL MEDICINE

## 2024-12-06 NOTE — PROGRESS NOTES
Patient ID: Santo Mcdaniel Jr. is a 74 y.o. male who presents for Follow-up.  HPI  The patient comes in for follow up.    He has type 2 diabetes hypertension hyperlipidemia and sleep apnea.    He remains on a more balanced diet but has not been consistent.    He has not been exercising.    When he has checked blood sugars they have been low 100s.    He remains off of glimepiride and has had no low blood sugars.    He continues on pioglitazone/metformin 15/1000 mg twice daily Farxiga 10 mg.    Physically he has no other complaints.    ROS  Comprehensive review of systems is negative.    Objective   Physical Exam  Visit Vitals  /78      Vitals:    12/06/24 1013   Weight: 79.8 kg (176 lb)      Body mass index is 27.16 kg/m².      Weight 176 down 21 pounds for a total of 70    ENT normal. No adenopathy  Fundi normal  Thyroid palpable and normal. No nodules  Chest clear to auscultation  Heart sounds are normal  Abdomen nontender. Bowel sounds normal. No organomegaly  Feet are okay  Normal vibration and monofilament sensation normal pulses, no lesions    Current Outpatient Medications   Medication Sig Dispense Refill    amLODIPine (Norvasc) 5 mg tablet Take 1 tablet (5 mg) by mouth once daily. 90 tablet 3    aspirin 81 mg EC tablet Take 1 tablet (81 mg) by mouth once daily.      blood sugar diagnostic (Contour Next Test Strips) strip TEST twice a day      dapagliflozin propanediol (Farxiga) 10 mg Take 1 tablet (10 mg) by mouth once daily. 90 tablet 3    ergocalciferol (Vitamin D-2) 1.25 MG (47432 UT) capsule once a week.      famotidine (Pepcid) 20 mg tablet Take by mouth.      latanoprost (Xalatan) 0.005 % ophthalmic solution USE 1 DROP IN BOTH EYES DAILY AT BEDTIME.      olmesartan (BENIcar) 20 mg tablet Take 1 tablet (20 mg) by mouth once daily. 90 tablet 3    omeprazole (PriLOSEC) 20 mg DR capsule Take by mouth.      pioglitazone-metformin (ACTOPlus Met)  mg tablet TAKE 1 TABLET BY MOUTH EVERY DAY 90 tablet  2    pioglitazone-metformin (ACTOPlus Met)  mg tablet One twice daily 60 tablet 11    potassium chloride CR 10 mEq ER tablet twice a day.      rosuvastatin (Crestor) 10 mg tablet TAKE 1 TABLET BY MOUTH EVERY DAY 90 tablet 2    sildenafil (Viagra) 100 mg tablet Take by mouth.      tadalafil (Cialis) 10 mg tablet Take 1 tablet (10 mg) by mouth once daily as needed for erectile dysfunction. 12 tablet 0    tadalafil (Cialis) 10 mg tablet Take 1 tablet (10 mg) by mouth if needed for erectile dysfunction. 15 tablet 3    tadalafil (Cialis) 5 mg tablet Take 1 tablet (5 mg) by mouth once daily. 30 tablet 2    tadalafil (Cialis) 5 mg tablet Take 1 tablet (5 mg) by mouth once daily. 30 tablet 3    timolol/brimonidin/dorzolam/PF (timoloL-brimonidi-dorzolam,PF,) 0.5-0.15-2 % drops Administer into affected eye(s) once daily.       No current facility-administered medications for this visit.       Assessment/Plan     1.  Type 2 diabetes  2.  Hypertension  3.  Hyperlipidemia  4.  Sleep apnea    We reviewed his most recent blood work.    He will continue his current regimen.    He will continue checking blood sugars on occasion.    He will continue to work on diet and exercise.    He will follow-up with me in 6 months sooner as needed.       Yes...

## 2025-01-13 ENCOUNTER — LAB (OUTPATIENT)
Dept: LAB | Facility: LAB | Age: 75
End: 2025-01-13
Payer: MEDICARE

## 2025-01-13 ENCOUNTER — APPOINTMENT (OUTPATIENT)
Dept: PRIMARY CARE | Facility: CLINIC | Age: 75
End: 2025-01-13
Payer: MEDICARE

## 2025-01-13 VITALS
WEIGHT: 178.6 LBS | HEART RATE: 60 BPM | DIASTOLIC BLOOD PRESSURE: 86 MMHG | OXYGEN SATURATION: 98 % | SYSTOLIC BLOOD PRESSURE: 138 MMHG | BODY MASS INDEX: 27.56 KG/M2

## 2025-01-13 DIAGNOSIS — E11.3511 TYPE 2 DIABETES MELLITUS WITH RIGHT EYE AFFECTED BY PROLIFERATIVE RETINOPATHY AND MACULAR EDEMA, WITHOUT LONG-TERM CURRENT USE OF INSULIN: Primary | ICD-10-CM

## 2025-01-13 DIAGNOSIS — E78.5 HYPERLIPIDEMIA, UNSPECIFIED HYPERLIPIDEMIA TYPE: ICD-10-CM

## 2025-01-13 DIAGNOSIS — I10 BENIGN ESSENTIAL HYPERTENSION: ICD-10-CM

## 2025-01-13 DIAGNOSIS — E66.3 OVERWEIGHT (BMI 25.0-29.9): ICD-10-CM

## 2025-01-13 DIAGNOSIS — E11.3511 TYPE 2 DIABETES MELLITUS WITH RIGHT EYE AFFECTED BY PROLIFERATIVE RETINOPATHY AND MACULAR EDEMA, WITHOUT LONG-TERM CURRENT USE OF INSULIN: ICD-10-CM

## 2025-01-13 LAB
ALBUMIN SERPL BCP-MCNC: 3.7 G/DL (ref 3.4–5)
ALP SERPL-CCNC: 66 U/L (ref 33–136)
ALT SERPL W P-5'-P-CCNC: 10 U/L (ref 10–52)
ANION GAP SERPL CALC-SCNC: 11 MMOL/L (ref 10–20)
AST SERPL W P-5'-P-CCNC: 12 U/L (ref 9–39)
BASOPHILS # BLD AUTO: 0.05 X10*3/UL (ref 0–0.1)
BASOPHILS NFR BLD AUTO: 1.1 %
BILIRUB SERPL-MCNC: 1.1 MG/DL (ref 0–1.2)
BUN SERPL-MCNC: 7 MG/DL (ref 6–23)
CALCIUM SERPL-MCNC: 9.6 MG/DL (ref 8.6–10.6)
CHLORIDE SERPL-SCNC: 101 MMOL/L (ref 98–107)
CO2 SERPL-SCNC: 33 MMOL/L (ref 21–32)
CREAT SERPL-MCNC: 0.81 MG/DL (ref 0.5–1.3)
EGFRCR SERPLBLD CKD-EPI 2021: >90 ML/MIN/1.73M*2
EOSINOPHIL # BLD AUTO: 0.09 X10*3/UL (ref 0–0.4)
EOSINOPHIL NFR BLD AUTO: 2 %
ERYTHROCYTE [DISTWIDTH] IN BLOOD BY AUTOMATED COUNT: 14.6 % (ref 11.5–14.5)
EST. AVERAGE GLUCOSE BLD GHB EST-MCNC: 123 MG/DL
GLUCOSE SERPL-MCNC: 102 MG/DL (ref 74–99)
HBA1C MFR BLD: 5.9 %
HCT VFR BLD AUTO: 39 % (ref 41–52)
HGB BLD-MCNC: 12.8 G/DL (ref 13.5–17.5)
IMM GRANULOCYTES # BLD AUTO: 0.01 X10*3/UL (ref 0–0.5)
IMM GRANULOCYTES NFR BLD AUTO: 0.2 % (ref 0–0.9)
LYMPHOCYTES # BLD AUTO: 1.35 X10*3/UL (ref 0.8–3)
LYMPHOCYTES NFR BLD AUTO: 30.7 %
MCH RBC QN AUTO: 28.2 PG (ref 26–34)
MCHC RBC AUTO-ENTMCNC: 32.8 G/DL (ref 32–36)
MCV RBC AUTO: 86 FL (ref 80–100)
MONOCYTES # BLD AUTO: 0.41 X10*3/UL (ref 0.05–0.8)
MONOCYTES NFR BLD AUTO: 9.3 %
NEUTROPHILS # BLD AUTO: 2.49 X10*3/UL (ref 1.6–5.5)
NEUTROPHILS NFR BLD AUTO: 56.7 %
NRBC BLD-RTO: 0 /100 WBCS (ref 0–0)
PLATELET # BLD AUTO: 276 X10*3/UL (ref 150–450)
POTASSIUM SERPL-SCNC: 3.8 MMOL/L (ref 3.5–5.3)
PROT SERPL-MCNC: 7.4 G/DL (ref 6.4–8.2)
RBC # BLD AUTO: 4.54 X10*6/UL (ref 4.5–5.9)
SODIUM SERPL-SCNC: 141 MMOL/L (ref 136–145)
WBC # BLD AUTO: 4.4 X10*3/UL (ref 4.4–11.3)

## 2025-01-13 PROCEDURE — 99213 OFFICE O/P EST LOW 20 MIN: CPT | Performed by: STUDENT IN AN ORGANIZED HEALTH CARE EDUCATION/TRAINING PROGRAM

## 2025-01-13 PROCEDURE — 83036 HEMOGLOBIN GLYCOSYLATED A1C: CPT

## 2025-01-13 PROCEDURE — 3079F DIAST BP 80-89 MM HG: CPT | Performed by: STUDENT IN AN ORGANIZED HEALTH CARE EDUCATION/TRAINING PROGRAM

## 2025-01-13 PROCEDURE — 1126F AMNT PAIN NOTED NONE PRSNT: CPT | Performed by: STUDENT IN AN ORGANIZED HEALTH CARE EDUCATION/TRAINING PROGRAM

## 2025-01-13 PROCEDURE — 85025 COMPLETE CBC W/AUTO DIFF WBC: CPT

## 2025-01-13 PROCEDURE — G2211 COMPLEX E/M VISIT ADD ON: HCPCS | Performed by: STUDENT IN AN ORGANIZED HEALTH CARE EDUCATION/TRAINING PROGRAM

## 2025-01-13 PROCEDURE — 1123F ACP DISCUSS/DSCN MKR DOCD: CPT | Performed by: STUDENT IN AN ORGANIZED HEALTH CARE EDUCATION/TRAINING PROGRAM

## 2025-01-13 PROCEDURE — 80053 COMPREHEN METABOLIC PANEL: CPT

## 2025-01-13 PROCEDURE — 4010F ACE/ARB THERAPY RXD/TAKEN: CPT | Performed by: STUDENT IN AN ORGANIZED HEALTH CARE EDUCATION/TRAINING PROGRAM

## 2025-01-13 PROCEDURE — 3075F SYST BP GE 130 - 139MM HG: CPT | Performed by: STUDENT IN AN ORGANIZED HEALTH CARE EDUCATION/TRAINING PROGRAM

## 2025-01-13 ASSESSMENT — PAIN SCALES - GENERAL: PAINLEVEL_OUTOF10: 0-NO PAIN

## 2025-01-13 ASSESSMENT — PATIENT HEALTH QUESTIONNAIRE - PHQ9
1. LITTLE INTEREST OR PLEASURE IN DOING THINGS: NOT AT ALL
SUM OF ALL RESPONSES TO PHQ9 QUESTIONS 1 AND 2: 0
2. FEELING DOWN, DEPRESSED OR HOPELESS: NOT AT ALL

## 2025-01-13 NOTE — PATIENT INSTRUCTIONS
Please stop at any  lab ([address] if you choose to use my building) to complete your blood and/or urine work that I've ordered for you.    I will contact you with the results at my soonest convenience. I strongly urge you to use [employer] as this is the quickest and easiest way to access your results and receive my correspondences.     Your medications are up to date today, I will renew them when a refill is due.     Follow up with your specialists as previously scheduled.     See me in 3-6 months for your Medicare Wellness Visit.

## 2025-01-13 NOTE — PROGRESS NOTES
Subjective   Patient ID: Santo Mcdaniel Jr. is a 74 y.o. male who presents for Follow-up (3m fuv).    HPI   Doing well since last in.     Re: DM2 - A1c with continued stability/improvement. Opts for in lab A1c as due for other lab work.   Follows with endocrinology (Rm). Reports better compliance with meds as of late. Patient denies symptomatic highs or lows with regards to their glucose. Reports no polyuria, polydipsia, fatigue, and stocking/glove neuropathy. Reports fair foot hygiene, denies knowledge of any foot ulcers or trauma. Sees eye doctor for vision issues.      Re: HTN - a little elevated today but came down nicely once he settled in. Reports no sx high or low from HTN; denies blurry vision, HA, dizziness LoC CP SoB [place] and leg swelling      Re: CYNDIE - Excellent compliance with CPAP; using this nightly.      Re: obesity - Down another few pounds, now 178lb.       Re: HM - PSA current. Colon screen UTD (due next 9/2025). Shots UTD.      PMHx, FHx, Social Hx, Surg Hx personally reviewed at this appointment. No pertinent findings and/or changes from prior (if applicable).     ROS: Denies wt gain/loss f/c HA LoC CP SOB NVDC. See HPI above, and scanned sheet (if applicable). All other systems are reviewed and are without complaint.     Review of Systems    Objective   /86   Pulse 60   Wt 81 kg (178 lb 9.6 oz)   SpO2 98%   BMI 27.56 kg/m²     Physical Exam  Gen: NAD. AAO x3.  HEENT: NC/AT. Anicteric sclera, symmetric pupils. MMM no thrush.  Neck: Soft, supple. No LAD. No goiter.  CV: RRR nl s1s2 no m/r/g  Pulm: CTAB no w/r/r, good air exchange  GI: soft NTND BS+ no hsm  Ext: WWP no edema  Neuro: II-XII grossly intact, nonfocal systemic findings  MSK: 5/5 strength b/l UE and LE  Gait: unremarkable   Feet: no ulcers, nl monofilament testing . Chronic onychomycosis.    Lab Results   Component Value Date    WBC 5.6 06/07/2024    HGB 13.1 (L) 06/07/2024    HCT 41.9 06/07/2024     06/07/2024    CHOL  115 06/07/2024    TRIG 93 06/07/2024    HDL 41.0 06/07/2024    ALT 8 (L) 06/07/2024    AST 14 06/07/2024     06/07/2024    K 4.0 06/07/2024     06/07/2024    CREATININE 1.01 06/07/2024    BUN 13 06/07/2024    CO2 29 06/07/2024    TSH 1.16 06/07/2024    PSA 1.24 06/07/2024    HGBA1C 6.1 09/24/2024     par    ELECTROCARDIOGRAM RHYTHM STRIP  Ordered by an unspecified provider.      Current Outpatient Medications   Medication Instructions    amLODIPine (NORVASC) 5 mg, oral, Daily RT    aspirin 81 mg, oral, Daily RT    blood sugar diagnostic (Contour Next Test Strips) strip TEST twice a day    dapagliflozin propanediol (FARXIGA) 10 mg, oral, Daily RT    ergocalciferol (Vitamin D-2) 1.25 MG (59702 UT) capsule Weekly    famotidine (Pepcid) 20 mg tablet oral    latanoprost (Xalatan) 0.005 % ophthalmic solution USE 1 DROP IN BOTH EYES DAILY AT BEDTIME.    olmesartan (BENICAR) 20 mg, oral, Daily    omeprazole (PriLOSEC) 20 mg DR capsule oral    pioglitazone-metformin (ACTOPlus Met)  mg tablet TAKE 1 TABLET BY MOUTH EVERY DAY    pioglitazone-metformin (ACTOPlus Met)  mg tablet One twice daily    potassium chloride CR 10 mEq ER tablet 2 times daily    rosuvastatin (CRESTOR) 10 mg, oral, Daily    sildenafil (Viagra) 100 mg tablet oral    tadalafil (CIALIS) 5 mg, oral, Daily    tadalafil (CIALIS) 10 mg, oral, Daily PRN    tadalafil (CIALIS) 10 mg, oral, As needed    tadalafil (CIALIS) 5 mg, oral, Daily    timolol/brimonidin/dorzolam/PF (timoloL-brimonidi-dorzolam,PF,) 0.5-0.15-2 % drops ophthalmic (eye), Daily RT          Assessment/Plan   Continued stability/improvement since last in with regards to weight and DM2 control.      # DM2: Stable.   - continue current meds  - Routine Diabetic labs  - counselled on wt loss, diet, exercise, and lifestyle modifications  - yearly foot exams, eye exams      # HTN: at/near goal  - continue current regimen  - routine lab work if not recent  - continue lifestyle  modifications     # CYNDIE on CPAP  - continue using CPAP      # BMI: 27  - counselled on wt loss via diet, exercise, and other lifestyle modifications      # Health Maintenance  - routine blood work  - Colon Cancer Screening: UTD, repeat 9/2025  - PSA: UTD  - Immunizations:  RSV, TDAP, VZV at pharmacy  - AAA screening: not indicated.

## 2025-01-25 LAB
CREAT UR-MCNC: 133 MG/DL (ref 20–370)
MICROALBUMIN UR-MCNC: 115.1 MG/L
MICROALBUMIN/CREAT UR: 86.5 UG/MG CREAT
MUCOUS THREADS #/AREA URNS AUTO: NORMAL /LPF
RBC #/AREA URNS AUTO: NORMAL /HPF
SQUAMOUS #/AREA URNS AUTO: NORMAL /HPF
WBC #/AREA URNS AUTO: NORMAL /HPF

## 2025-01-25 PROCEDURE — 81001 URINALYSIS AUTO W/SCOPE: CPT

## 2025-01-25 PROCEDURE — 82043 UR ALBUMIN QUANTITATIVE: CPT

## 2025-01-25 PROCEDURE — 82570 ASSAY OF URINE CREATININE: CPT

## 2025-06-06 ENCOUNTER — APPOINTMENT (OUTPATIENT)
Dept: ENDOCRINOLOGY | Facility: CLINIC | Age: 75
End: 2025-06-06
Payer: MEDICARE

## 2025-06-26 ENCOUNTER — APPOINTMENT (OUTPATIENT)
Dept: ENDOCRINOLOGY | Facility: CLINIC | Age: 75
End: 2025-06-26
Payer: MEDICARE

## 2025-06-26 VITALS — WEIGHT: 189 LBS | DIASTOLIC BLOOD PRESSURE: 78 MMHG | BODY MASS INDEX: 29.16 KG/M2 | SYSTOLIC BLOOD PRESSURE: 120 MMHG

## 2025-06-26 DIAGNOSIS — E78.5 HYPERLIPIDEMIA, UNSPECIFIED HYPERLIPIDEMIA TYPE: ICD-10-CM

## 2025-06-26 DIAGNOSIS — I10 BENIGN ESSENTIAL HYPERTENSION: ICD-10-CM

## 2025-06-26 DIAGNOSIS — E11.3511 TYPE 2 DIABETES MELLITUS WITH RIGHT EYE AFFECTED BY PROLIFERATIVE RETINOPATHY AND MACULAR EDEMA, WITHOUT LONG-TERM CURRENT USE OF INSULIN: Primary | ICD-10-CM

## 2025-06-26 LAB — POC HEMOGLOBIN A1C: 6.4 % (ref 4.2–7)

## 2025-06-26 PROCEDURE — 99214 OFFICE O/P EST MOD 30 MIN: CPT | Performed by: INTERNAL MEDICINE

## 2025-06-26 PROCEDURE — 83036 HEMOGLOBIN GLYCOSYLATED A1C: CPT | Performed by: INTERNAL MEDICINE

## 2025-06-26 PROCEDURE — 3078F DIAST BP <80 MM HG: CPT | Performed by: INTERNAL MEDICINE

## 2025-06-26 PROCEDURE — 3074F SYST BP LT 130 MM HG: CPT | Performed by: INTERNAL MEDICINE

## 2025-06-26 PROCEDURE — 3060F POS MICROALBUMINURIA REV: CPT | Performed by: INTERNAL MEDICINE

## 2025-06-26 PROCEDURE — 3044F HG A1C LEVEL LT 7.0%: CPT | Performed by: INTERNAL MEDICINE

## 2025-06-26 PROCEDURE — 4010F ACE/ARB THERAPY RXD/TAKEN: CPT | Performed by: INTERNAL MEDICINE

## 2025-06-26 NOTE — PROGRESS NOTES
Patient ID: Santo Mcdaniel Jr. is a 75 y.o. male who presents for Follow-up (DM follow up).  HPI  The patient comes in for follow up.    He has type 2 diabetes hypertension hyperlipidemia and sleep apnea.    He remains on a more balanced diet but has not been consistent.    He has not been exercising.    When he has checked blood sugars they have been low 100s.    He remains off of glimepiride and has had no low blood sugars.    He continues on pioglitazone/metformin 15/1000 mg twice daily Farxiga 10 mg.    Physically he has no other complaints.      ROS  Comprehensive review of systems is negative.    Objective   Physical Exam  Visit Vitals  /78      Vitals:    06/26/25 1345   Weight: 85.7 kg (189 lb)      Body mass index is 29.16 kg/m².      Weight 189 up 13 pounds to down 57    ENT normal. No adenopathy  Fundi normal  Thyroid palpable and normal. No nodules  Chest clear to auscultation  Heart sounds are normal  Abdomen nontender. Bowel sounds normal. No organomegaly  Feet are okay  Normal vibration and monofilament sensation normal pulses, no lesions  Current Medications[1]    Assessment/Plan     1.  Type 2 diabetes  2.  Hypertension  3.  Hyperlipidemia  4.  Sleep apnea    Will check hemoglobin A1c by fingerstick today.    He is going to work to tighten up his diet and get back to exercising.    He will continue his current regimen.    Will consider the addition of Ozempic.    He will follow-up with me in 6 months sooner as needed.             [1]   Current Outpatient Medications   Medication Sig Dispense Refill    amLODIPine (Norvasc) 5 mg tablet Take 1 tablet (5 mg) by mouth once daily. 90 tablet 3    aspirin 81 mg EC tablet Take 1 tablet (81 mg) by mouth once daily.      blood sugar diagnostic (Contour Next Test Strips) strip TEST twice a day      dapagliflozin propanediol (Farxiga) 10 mg Take 1 tablet (10 mg) by mouth once daily. 90 tablet 3    ergocalciferol (Vitamin D-2) 1.25 MG (79336 UT) capsule once  a week.      famotidine (Pepcid) 20 mg tablet Take by mouth.      latanoprost (Xalatan) 0.005 % ophthalmic solution USE 1 DROP IN BOTH EYES DAILY AT BEDTIME.      olmesartan (BENIcar) 20 mg tablet Take 1 tablet (20 mg) by mouth once daily. 90 tablet 3    omeprazole (PriLOSEC) 20 mg DR capsule Take by mouth.      pioglitazone-metformin (ACTOPlus Met)  mg tablet TAKE 1 TABLET BY MOUTH EVERY DAY 90 tablet 2    pioglitazone-metformin (ACTOPlus Met)  mg tablet One twice daily 60 tablet 11    potassium chloride CR 10 mEq ER tablet twice a day.      rosuvastatin (Crestor) 10 mg tablet TAKE 1 TABLET BY MOUTH EVERY DAY 90 tablet 2    sildenafil (Viagra) 100 mg tablet Take by mouth.      tadalafil (Cialis) 10 mg tablet Take 1 tablet (10 mg) by mouth once daily as needed for erectile dysfunction. 12 tablet 0    tadalafil (Cialis) 10 mg tablet Take 1 tablet (10 mg) by mouth if needed for erectile dysfunction. 15 tablet 3    tadalafil (Cialis) 5 mg tablet Take 1 tablet (5 mg) by mouth once daily. 30 tablet 2    tadalafil (Cialis) 5 mg tablet Take 1 tablet (5 mg) by mouth once daily. 30 tablet 3    timolol/brimonidin/dorzolam/PF (timoloL-brimonidi-dorzolam,PF,) 0.5-0.15-2 % drops Administer into affected eye(s) once daily.       No current facility-administered medications for this visit.

## 2025-07-17 ENCOUNTER — APPOINTMENT (OUTPATIENT)
Dept: UROLOGY | Facility: HOSPITAL | Age: 75
End: 2025-07-17
Payer: MEDICARE

## 2025-12-19 ENCOUNTER — APPOINTMENT (OUTPATIENT)
Dept: ENDOCRINOLOGY | Facility: CLINIC | Age: 75
End: 2025-12-19
Payer: MEDICARE